# Patient Record
Sex: FEMALE | Race: WHITE | NOT HISPANIC OR LATINO | Employment: UNEMPLOYED | URBAN - NONMETROPOLITAN AREA
[De-identification: names, ages, dates, MRNs, and addresses within clinical notes are randomized per-mention and may not be internally consistent; named-entity substitution may affect disease eponyms.]

---

## 2018-03-07 ENCOUNTER — OFFICE VISIT (OUTPATIENT)
Dept: CARDIOLOGY | Facility: CLINIC | Age: 23
End: 2018-03-07

## 2018-03-07 VITALS
SYSTOLIC BLOOD PRESSURE: 129 MMHG | WEIGHT: 211.4 LBS | OXYGEN SATURATION: 97 % | HEART RATE: 106 BPM | BODY MASS INDEX: 35.22 KG/M2 | HEIGHT: 65 IN | DIASTOLIC BLOOD PRESSURE: 88 MMHG

## 2018-03-07 DIAGNOSIS — R06.02 SHORTNESS OF BREATH: ICD-10-CM

## 2018-03-07 DIAGNOSIS — R00.2 PALPITATIONS: Primary | ICD-10-CM

## 2018-03-07 DIAGNOSIS — R00.0 TACHYCARDIA: ICD-10-CM

## 2018-03-07 PROCEDURE — 99204 OFFICE O/P NEW MOD 45 MIN: CPT | Performed by: PHYSICIAN ASSISTANT

## 2018-03-07 PROCEDURE — 93000 ELECTROCARDIOGRAM COMPLETE: CPT | Performed by: PHYSICIAN ASSISTANT

## 2018-03-07 NOTE — PROGRESS NOTES
Subjective   Melissa Kramer is a 22 y.o. female     Chief Complaint   Patient presents with   • [facility]     patient appears in office today to [facility] cardiac care    • Palpitations       HPI  The patient presents today for initial evaluation.  She presents in the setting of palpitations and shortness of air.  The patient reports no onset palpitations approximately 2-1/2 weeks ago.  She tells me that she was in her usual state of health when she started noticing onset of brief irregularities.  She had no change in lifestyle at that time.  She had no change in medical regimen, over-the-counter or prescribed.  She would note brief irregularities in heart rhythm followed by tachycardia at times.  This persisted for several days.  She found nothing which would aggravate or alleviate her symptoms.  She does note that she would get mild increase in dyspnea with symptoms of palpitations and tachycardia.  The patient had no PND orthopnea.  She did have laboratory evaluation with her primary care provider.  She tells me that labs at that time were unremarkable.  We do not have copies of those labs.  Exercise capacity has been somewhat limited because of symptoms.  Otherwise, she has had no failure symptoms.  She has had only mild chest pain when her palpitations are little more severe.  She was scheduled for Holter and an echo through [facility].  We have not received copies of those.  She has no further complaints otherwise.      No current outpatient prescriptions on file.     No current facility-administered medications for this visit.        Keflex [cephalexin]    History reviewed. No pertinent past medical history.    Social History     Social History   • Marital status:      Spouse name: N/A   • Number of children: N/A   • Years of education: N/A     Occupational History   • Not on file.     Social History Main Topics   • Smoking status: Never Smoker   • Smokeless tobacco: Never Used   • Alcohol  "use No   • Drug use: No   • Sexual activity: Defer     Other Topics Concern   • Not on file     Social History Narrative   • No narrative on file       Family History   Problem Relation Age of Onset   • No Known Problems Mother    • No Known Problems Father        Review of Systems   Constitutional: Positive for fatigue (increase in fatigue).   HENT: Negative.  Negative for congestion, rhinorrhea, sneezing and sore throat.    Eyes: Negative.  Negative for visual disturbance.   Respiratory: Positive for shortness of breath (easily SOA; worse on exertion ). Negative for apnea, cough, chest tightness and wheezing.    Cardiovascular: Positive for palpitations (increasing episodes of palpitations/flutters). Negative for chest pain (denies CP) and leg swelling.   Gastrointestinal: Negative.  Negative for abdominal distention, abdominal pain, nausea and vomiting.   Endocrine: Negative.  Negative for cold intolerance, heat intolerance, polyphagia and polyuria.   Genitourinary: Negative.  Negative for difficulty urinating, frequency and urgency.   Musculoskeletal: Negative.  Negative for arthralgias, back pain, myalgias, neck pain and neck stiffness.   Skin: Negative.  Negative for rash and wound.   Allergic/Immunologic: Negative.  Negative for environmental allergies and food allergies.   Neurological: Negative.  Negative for dizziness, weakness, light-headedness and headaches.   Hematological: Bruises/bleeds easily (bruises and bleeds easily).   Psychiatric/Behavioral: Negative for agitation, confusion and sleep disturbance (denies waking up smothering/SOA). The patient is nervous/anxious (easily nervous/anxious).        Objective     Vitals:    03/07/18 1319   BP: 129/88   BP Location: Left arm   Patient Position: Standing   Pulse: 106   SpO2: 97%   Weight: 95.9 kg (211 lb 6.4 oz)   Height: 165.1 cm (65\")        /88 (BP Location: Left arm, Patient Position: Standing)  Pulse 106  Ht 165.1 cm (65\")  Wt 95.9 kg (211 " lb 6.4 oz)  SpO2 97%  BMI 35.18 kg/m2     Lab Results (most recent)     None          Physical Exam   Constitutional: She is oriented to person, place, and time. She appears well-developed and well-nourished. No distress.   HENT:   Head: Normocephalic and atraumatic.   Eyes: Conjunctivae and EOM are normal. Pupils are equal, round, and reactive to light.   Neck: Normal range of motion. Neck supple. No JVD present. No tracheal deviation present.   Cardiovascular: Normal rate, regular rhythm, normal heart sounds and intact distal pulses.    Pulmonary/Chest: Effort normal and breath sounds normal.   Abdominal: Soft. Bowel sounds are normal. She exhibits no distension and no mass. There is no tenderness. There is no rebound and no guarding.   Musculoskeletal: Normal range of motion. She exhibits no edema, tenderness or deformity.   Neurological: She is alert and oriented to person, place, and time.   Skin: Skin is warm and dry. No rash noted. No erythema. No pallor.   Psychiatric: She has a normal mood and affect. Her behavior is normal. Judgment and thought content normal.   Nursing note and vitals reviewed.      Procedure     ECG 12 Lead  Date/Time: 3/7/2018 1:43 PM  Performed by: FREDO GRANT  Authorized by: FREDO GRANT   Comments: Palpitations    Sinus rhythm at 72, normal axis, early precordial R-wave progression, no acute changes noted.                 Assessment/Plan      Diagnosis Plan   1. Palpitations  ECG 12 Lead   2. Tachycardia     3. Shortness of breath         The patient's Holter and echo is not available today.  That has not been read at [facility].  We have called and requested those results.  I would like to review those before we recommend further to the patient.  We have also requested labs from her primary care provider.  Nothing further for now.  We will review the above and recommend further to the patient at that time.  She will call for any complications prior to  follow-up.            Patient's BMI is above normal parameters. Follow-up plan includes:  educational material and referral to primary care.           Electronically signed by:

## 2018-03-07 NOTE — PATIENT INSTRUCTIONS
Palpitations  A palpitation is the feeling that your heart:  · Has an uneven (irregular) heartbeat.  · Is beating faster than normal.  · Is fluttering.  · Is skipping a beat.  This is usually not a serious problem. In some cases, you may need more medical tests.  Follow these instructions at home:  · Avoid:  ¨ Caffeine in coffee, tea, soft drinks, diet pills, and energy drinks.  ¨ Chocolate.  ¨ Alcohol.  · Do not use any tobacco products. These include cigarettes, chewing tobacco, and e-cigarettes. If you need help quitting, ask your doctor.  · Try to reduce your stress. These things may help:  ¨ Yoga.  ¨ Meditation.  ¨ Physical activity. Swimming, jogging, and walking are good choices.  ¨ A method that helps you use your mind to control things in your body, like heartbeats (biofeedback).  · Get plenty of rest and sleep.  · Take over-the-counter and prescription medicines only as told by your doctor.  · Keep all follow-up visits as told by your doctor. This is important.  Contact a doctor if:  · Your heartbeat is still fast or uneven after 24 hours.  · Your palpitations occur more often.  Get help right away if:  · You have chest pain.  · You feel short of breath.  · You have a very bad headache.  · You feel dizzy.  · You pass out (faint).  This information is not intended to replace advice given to you by your health care provider. Make sure you discuss any questions you have with your health care provider.  Document Released: 09/26/2009 Document Revised: 05/25/2017 Document Reviewed: 09/01/2016  [employer] Interactive Patient Education © 2017 [employer]  BMI for Adults  Body mass index (BMI) is a number that is calculated from a person's weight and height. In most adults, the number is used to find how much of an adult's weight is made up of fat. BMI is not as accurate as a direct measure of body fat.  How is BMI calculated?  BMI is calculated by dividing weight in kilograms by height in meters squared. It can  also be calculated by dividing weight in pounds by height in inches squared, then multiplying the resulting number by 703. Charts are available to help you find your BMI quickly and easily without doing this calculation.  How is BMI interpreted?  Health care professionals use BMI charts to identify whether an adult is underweight, at a normal weight, or overweight based on the following guidelines:  · Underweight: BMI less than 18.5.  · Normal weight: BMI between 18.5 and 24.9.  · Overweight: BMI between 25 and 29.9.  · Obese: BMI of 30 and above.  BMI is usually interpreted the same for males and females.  Weight includes both fat and muscle, so someone with a muscular build, such as an athlete, may have a BMI that is higher than 24.9. In cases like these, BMI may not accurately depict body fat. To determine if excess body fat is the cause of a BMI of 25 or higher, further assessments may need to be done by a health care provider.  Why is BMI a useful tool?  BMI is used to identify a possible weight problem that may be related to a medical problem or may increase the risk for medical problems. BMI can also be used to promote changes to reach a healthy weight.  This information is not intended to replace advice given to you by your health care provider. Make sure you discuss any questions you have with your health care provider.  Document Released: 08/29/2005 Document Revised: 04/27/2017 Document Reviewed: 05/15/2015  [employer] Interactive Patient Education © 2017 [employer]

## 2018-03-08 ENCOUNTER — TELEPHONE (OUTPATIENT)
Dept: CARDIOLOGY | Facility: CLINIC | Age: 23
End: 2018-03-08

## 2018-03-08 NOTE — TELEPHONE ENCOUNTER
Fredo Mayes, PAC reviewed Echo and Holter monitor From [facility]. Echo demonstrated mild pulmonary hypertension PA pressures 51 mmHg and Holter demonstrated rare PAC's normal sinus rhythm . Fredo wants to order a limited echo to reevaluate PA pressures. If patient's palpitations are very bothersome she can start metoprolol tartrate 25 mg BID. Patient verbalized understanding and wants to hold off for now on starting metoprolol. Patient is willing to proceed with limited echo and was informed Miriin would call to schedule testing and testing in done in our basement. Patient is to call our office with questions or concerns.      ----- Message from Bev Oliveros sent at 3/8/2018  2:44 PM EST -----  Contact: BERNA  PT CALLED REQUESTING ECHO RESULTS SHE HAD DONE FOR HER PCP BECAUSE HER PCP IS OUT TODAY.  PT SAW FREDO GUERRAWINDY YESTERDAY .

## 2018-03-12 ENCOUNTER — TELEPHONE (OUTPATIENT)
Dept: CARDIOLOGY | Facility: CLINIC | Age: 23
End: 2018-03-12

## 2018-03-12 DIAGNOSIS — I27.20 PULMONARY HYPERTENSION (HCC): ICD-10-CM

## 2018-03-12 DIAGNOSIS — R00.2 PALPITATIONS: ICD-10-CM

## 2018-03-12 DIAGNOSIS — R93.1 ABNORMAL ECHOCARDIOGRAM: ICD-10-CM

## 2018-03-12 DIAGNOSIS — R06.02 SHORTNESS OF BREATH: Primary | ICD-10-CM

## 2018-03-12 NOTE — TELEPHONE ENCOUNTER
Patient aware of echo as described in last telephone call.     ----- Message from Mariana Oshea sent at 3/12/2018  4:02 PM EDT -----   BERNA IS CALLING TO SEE IF WE GOT THE RESULTS OF HER ECHO FROM [facility] YET. SHE CAN BE REACHED [phone]

## 2018-04-11 ENCOUNTER — OFFICE VISIT (OUTPATIENT)
Dept: CARDIOLOGY | Facility: CLINIC | Age: 23
End: 2018-04-11

## 2018-04-11 VITALS
BODY MASS INDEX: 35.19 KG/M2 | HEIGHT: 65 IN | WEIGHT: 211.2 LBS | OXYGEN SATURATION: 97 % | HEART RATE: 86 BPM | DIASTOLIC BLOOD PRESSURE: 78 MMHG | SYSTOLIC BLOOD PRESSURE: 116 MMHG

## 2018-04-11 DIAGNOSIS — R42 DIZZINESS: ICD-10-CM

## 2018-04-11 DIAGNOSIS — R07.9 CHEST PAIN, UNSPECIFIED TYPE: Primary | ICD-10-CM

## 2018-04-11 DIAGNOSIS — R60.0 LOCALIZED EDEMA: ICD-10-CM

## 2018-04-11 DIAGNOSIS — R00.2 PALPITATIONS: ICD-10-CM

## 2018-04-11 DIAGNOSIS — I27.20 PULMONARY HYPERTENSION (HCC): ICD-10-CM

## 2018-04-11 DIAGNOSIS — R93.1 ABNORMAL ECHOCARDIOGRAM: ICD-10-CM

## 2018-04-11 DIAGNOSIS — R06.02 SHORTNESS OF BREATH: ICD-10-CM

## 2018-04-11 PROCEDURE — 99214 OFFICE O/P EST MOD 30 MIN: CPT | Performed by: INTERNAL MEDICINE

## 2018-04-11 NOTE — PATIENT INSTRUCTIONS
"Fat and Cholesterol Restricted Diet  Getting too much fat and cholesterol in your diet may cause health problems. Following this diet helps keep your fat and cholesterol at normal levels. This can keep you from getting sick.  What types of fat should I choose?  · Choose monosaturated and polyunsaturated fats. These are found in foods such as olive oil, canola oil, flaxseeds, walnuts, almonds, and seeds.  · Eat more omega-3 fats. Good choices include salmon, mackerel, sardines, tuna, flaxseed oil, and ground flaxseeds.  · Limit saturated fats. These are in animal products such as meats, butter, and cream. They can also be in plant products such as palm oil, palm kernel oil, and coconut oil.  · Avoid foods with partially hydrogenated oils in them. These contain trans fats. Examples of foods that have trans fats are stick margarine, some tub margarines, cookies, crackers, and other baked goods.  What general guidelines do I need to follow?  · Check food labels. Look for the words \"trans fat\" and \"saturated fat.\"  · When preparing a meal:  ¨ Fill half of your plate with vegetables and green salads.  ¨ Fill one fourth of your plate with whole grains. Look for the word \"whole\" as the first word in the ingredient list.  ¨ Fill one fourth of your plate with lean protein foods.  · Eat more foods that have fiber, like apples, carrots, beans, peas, and barley.  · Eat more home-cooked foods. Eat less at restaurants and buffets.  · Limit or avoid alcohol.  · Limit foods high in starch and sugar.  · Limit fried foods.  · Cook foods without frying them. Baking, boiling, grilling, and broiling are all great options.  · Lose weight if you are overweight. Losing even a small amount of weight can help your overall health. It can also help prevent diseases such as diabetes and heart disease.  What foods can I eat?  Grains   Whole grains, such as whole wheat or whole grain breads, crackers, cereals, and pasta. Unsweetened oatmeal, " bulgur, barley, quinoa, or brown rice. Corn or whole wheat flour tortillas.  Vegetables   Fresh or frozen vegetables (raw, steamed, roasted, or grilled). Green salads.  Fruits   All fresh, canned (in natural juice), or frozen fruits.  Meat and Other Protein Products   Ground beef (85% or leaner), grass-fed beef, or beef trimmed of fat. Skinless chicken or turkey. Ground chicken or turkey. Pork trimmed of fat. All fish and seafood. Eggs. Dried beans, peas, or lentils. Unsalted nuts or seeds. Unsalted canned or dry beans.  Dairy   Low-fat dairy products, such as skim or 1% milk, 2% or reduced-fat cheeses, low-fat ricotta or cottage cheese, or plain low-fat yogurt.  Fats and Oils   Tub margarines without trans fats. Light or reduced-fat mayonnaise and salad dressings. Avocado. Olive, canola, sesame, or safflower oils. Natural peanut or almond butter (choose ones without added sugar and oil).  The items listed above may not be a complete list of recommended foods or beverages. Contact your dietitian for more options.   What foods are not recommended?  Grains   White bread. White pasta. White rice. Cornbread. Bagels, pastries, and croissants. Crackers that contain trans fat.  Vegetables   White potatoes. Corn. Creamed or fried vegetables. Vegetables in a cheese sauce.  Fruits   Dried fruits. Canned fruit in light or heavy syrup. Fruit juice.  Meat and Other Protein Products   Fatty cuts of meat. Ribs, chicken wings, guerra, sausage, bologna, salami, chitterlings, fatback, hot dogs, bratwurst, and packaged luncheon meats. Liver and organ meats.  Dairy   Whole or 2% milk, cream, half-and-half, and cream cheese. Whole milk cheeses. Whole-fat or sweetened yogurt. Full-fat cheeses. Nondairy creamers and whipped toppings. Processed cheese, cheese spreads, or cheese curds.  Sweets and Desserts   Corn syrup, sugars, honey, and molasses. Candy. Jam and jelly. Syrup. Sweetened cereals. Cookies, pies, cakes, donuts, muffins, and ice  cream.  Fats and Oils   Butter, stick margarine, lard, shortening, ghee, or guerra fat. Coconut, palm kernel, or palm oils.  Beverages   Alcohol. Sweetened drinks (such as sodas, lemonade, and fruit drinks or punches).  The items listed above may not be a complete list of foods and beverages to avoid. Contact your dietitian for more information.   This information is not intended to replace advice given to you by your health care provider. Make sure you discuss any questions you have with your health care provider.  Document Released: 06/18/2013 Document Revised: 08/24/2017 Document Reviewed: 03/19/2015  [employer] Interactive Patient Education © 2017 [employer]  BMI for Adults  Body mass index (BMI) is a number that is calculated from a person's weight and height. In most adults, the number is used to find how much of an adult's weight is made up of fat. BMI is not as accurate as a direct measure of body fat.  How is BMI calculated?  BMI is calculated by dividing weight in kilograms by height in meters squared. It can also be calculated by dividing weight in pounds by height in inches squared, then multiplying the resulting number by 703. Charts are available to help you find your BMI quickly and easily without doing this calculation.  How is BMI interpreted?  Health care professionals use BMI charts to identify whether an adult is underweight, at a normal weight, or overweight based on the following guidelines:  · Underweight: BMI less than 18.5.  · Normal weight: BMI between 18.5 and 24.9.  · Overweight: BMI between 25 and 29.9.  · Obese: BMI of 30 and above.  BMI is usually interpreted the same for males and females.  Weight includes both fat and muscle, so someone with a muscular build, such as an athlete, may have a BMI that is higher than 24.9. In cases like these, BMI may not accurately depict body fat. To determine if excess body fat is the cause of a BMI of 25 or higher, further assessments may need to be  done by a health care provider.  Why is BMI a useful tool?  BMI is used to identify a possible weight problem that may be related to a medical problem or may increase the risk for medical problems. BMI can also be used to promote changes to reach a healthy weight.  This information is not intended to replace advice given to you by your health care provider. Make sure you discuss any questions you have with your health care provider.  Document Released: 08/29/2005 Document Revised: 04/27/2017 Document Reviewed: 05/15/2015  [employer] Interactive Patient Education © 2017 [employer]

## 2018-04-11 NOTE — PROGRESS NOTES
Subjective   Melissa Kramer is a 22 y.o. female     Chief Complaint   Patient presents with   • Follow-up     1 month f/u with Dr. Lloyd per Norberto briceno    • Results     echo        PROBLEM LIST:     1. Palpitations   2. Shortness of breath   2.1 echo from [facility] LVEF 60%, normal diastolic dysfunction, trace MR, borderline pulmonary HTNsystolic pressure estimated 51 mmHg.   3. Chest pain   4. Dizziness       Specialty Problems     None            HPI:  Ms. Kramer returns for follow-up on the above problems.  She continues to have episodes of palpitations, chest pain, and dizziness.  She has not been presyncopal or syncopal.    The patient describes to me short-lived episodes of a rapid regular heartbeat that cause some degree of dizziness.  She also has chest pain which is described as a deep retrosternal ache with some pleuritic component (no worse when coughing).  Symptoms last anywhere from 5 minutes to an hour or more.  The patient also relates that she hasn't had a general decline in her exercise capacity and increase in exertional dyspnea without significant coughing or wheezing.    Echocardiogram reported preserved LV systolic function, no significant diastolic or valve abnormalities, and pulmonary artery systolic pressures of approximately 50 mmHg.  Patient does relate an increase in bilateral lower extremity edema over the past several weeks.              CURRENT MEDICATION:    No current outpatient prescriptions on file.     No current facility-administered medications for this visit.        ALLERGIES:    Keflex [cephalexin]    PAST MEDICAL HISTORY:    History reviewed. No pertinent past medical history.    SURGICAL HISTORY:    Past Surgical History:   Procedure Laterality Date   • WISDOM TOOTH EXTRACTION         SOCIAL HISTORY:    Social History     Social History   • Marital status:      Spouse name: N/A   • Number of children: N/A   • Years of education: N/A     Occupational History   • Not on  file.     Social History Main Topics   • Smoking status: Never Smoker   • Smokeless tobacco: Never Used   • Alcohol use No   • Drug use: No   • Sexual activity: Defer     Other Topics Concern   • Not on file     Social History Narrative   • No narrative on file       FAMILY HISTORY:    Family History   Problem Relation Age of Onset   • Clotting disorder Mother      acute DVt    • Cancer Mother    • Heart disease Mother      cardiac arrest   • Other Mother      respiratory failure    • No Known Problems Father    • Heart disease Maternal Grandfather    • Heart attack Maternal Grandfather        Review of Systems   Constitutional: Positive for diaphoresis. Negative for chills, fatigue and fever.   HENT: Positive for ear pain (left ear pain ) and trouble swallowing. Negative for congestion, dental problem, drooling, ear discharge, facial swelling, hearing loss, mouth sores, nosebleeds, postnasal drip, rhinorrhea, sinus pain, sinus pressure, sneezing, sore throat, tinnitus and voice change.    Eyes: Negative for visual disturbance.   Respiratory: Positive for shortness of breath (with over exertion ). Negative for cough, choking, chest tightness, wheezing and stridor.    Cardiovascular: Positive for chest pain (sharp pain and heaviness, worse with cough, lasting 5 minutes to an hour, occurs with exertion and at rest ), palpitations and leg swelling.   Gastrointestinal: Negative for abdominal pain, blood in stool (no melena, no hematuria, no hematemesis, no hematochezia ), constipation, diarrhea, nausea and vomiting.   Endocrine: Positive for cold intolerance. Negative for heat intolerance.   Genitourinary: Negative for difficulty urinating, frequency, urgency and vaginal pain.   Musculoskeletal: Positive for arthralgias (ankle ). Negative for back pain, gait problem, joint swelling, myalgias, neck pain and neck stiffness.   Skin: Negative for color change, pallor, rash and wound.   Allergic/Immunologic: Negative for  "environmental allergies, food allergies and immunocompromised state.   Neurological: Positive for dizziness (improved since last visit ) and light-headedness (with palpitations ). Negative for tremors, seizures, syncope, facial asymmetry, speech difficulty, weakness, numbness and headaches.   Hematological: Negative for adenopathy. Does not bruise/bleed easily.       VISIT VITALS:  Vitals:    04/11/18 1128   BP: 116/78   BP Location: Left arm   Patient Position: Sitting   Pulse: 86   SpO2: 97%   Weight: 95.8 kg (211 lb 3.2 oz)   Height: 165.1 cm (65\")      /78 (BP Location: Left arm, Patient Position: Sitting)   Pulse 86   Ht 165.1 cm (65\")   Wt 95.8 kg (211 lb 3.2 oz)   SpO2 97%   BMI 35.15 kg/m²     RECENT LABS:    Objective       Physical Exam   Constitutional: She is oriented to person, place, and time. She appears well-developed and well-nourished. No distress.   HENT:   Head: Normocephalic and atraumatic.   Eyes: Conjunctivae, EOM and lids are normal. Pupils are equal, round, and reactive to light. Lids are everted and swept, no foreign bodies found.   Neck: Normal range of motion. Neck supple. Normal carotid pulses, no hepatojugular reflux and no JVD present. Carotid bruit is not present. No tracheal deviation present. No thyroid mass and no thyromegaly present.   Cardiovascular: Normal rate, regular rhythm, S1 normal, S2 normal, normal heart sounds and intact distal pulses.  Exam reveals no gallop, no S3, no S4, no distant heart sounds and no friction rub.    No murmur heard.   No systolic murmur is present    No diastolic murmur is present   P2 is not pronounced      Pulmonary/Chest: Effort normal. She has decreased breath sounds (right base ). She has no wheezes. She has no rhonchi. She has no rales.   Abdominal: Soft. Bowel sounds are normal. She exhibits no distension, no abdominal bruit and no mass. There is no tenderness. There is no rebound and no guarding.   Negative organomegaly    "   Musculoskeletal: Normal range of motion. She exhibits edema (trace edema BLE). She exhibits no tenderness or deformity.   Lymphadenopathy:     She has no cervical adenopathy.     She has no axillary adenopathy.   Neurological: She is alert and oriented to person, place, and time.   Skin: Skin is warm and dry. No rash noted. No erythema. No pallor.   Psychiatric: She has a normal mood and affect. Her speech is normal and behavior is normal. Judgment and thought content normal. Cognition and memory are normal.   Nursing note and vitals reviewed.      Procedures      Assessment/Plan    #1.  Symptom complex of chest pain, palpitations, and dizziness.  This, in conjunction with an echo cardiogram demonstrating moderate pulmonary hypertension, raises the possibility of multi-embolic pulmonary hypertension/pulmonary embolism as a cause of symptoms.    #2.  Therefore, I would like to order stat d-dimer and, if positive, follow-up with CT pulmonary angiogram.    #3.  We will perform a 14 day event monitor to assess ongoing rhythm abnormalities.    #4.  I would like to review the echocardiogram performed at [facility] (as opposed to simply reviewing the interpretive report) has I suspect, based on physical exam today, that a tricuspid valve closure click was measured as TR velocity rather than a regurgitant and below itself.    #5.  If d-dimer is positive the patient will undergo CT pulmonary angiogram on an expedited basis.  Otherwise, we will see the patient in follow-up after event monitor and echo were reviewed.    #6.  The patient understands to report immediately for any worsening of symptoms, and to report to the emergency room for severe chest pain, presyncope or syncope, as discussed today.          No diagnosis found.    No Follow-up on file.              Patient's Body mass index is 35.15 kg/m². BMI is above normal parameters. Follow-up plan includes:  educational material.       Karl Daniel  HAYLIE Jaffe   Scribed for Dr. Néstor Lloyd by SHAKIRA Iglesias April 11, 2018 12:26 PM               Electronically signed by:            This note is dictated utilizing voice recognition software.  Although this record has been proof read, transcriptional errors may still be present. If questions occur regarding the content of this record please do not hesitate to call our office.

## 2018-04-18 ENCOUNTER — TELEPHONE (OUTPATIENT)
Dept: CARDIOLOGY | Facility: CLINIC | Age: 23
End: 2018-04-18

## 2018-04-18 NOTE — TELEPHONE ENCOUNTER
I have given the CD and echo report to Dr. Lloyd to review. He will review those at his convenience and I will call patient once the films have been reviewed.       ----- Message from Ethel Winters sent at 4/18/2018 11:40 AM EDT -----  Contact: PT  DROPPED OFF DISK FROM [facility] WITH ECHO FOR DR LLOYD AT 11:38AM AND IT WAS GIVEN TO ALEXYS.

## 2018-04-19 ENCOUNTER — TELEPHONE (OUTPATIENT)
Dept: CARDIOLOGY | Facility: CLINIC | Age: 23
End: 2018-04-19

## 2018-04-19 NOTE — TELEPHONE ENCOUNTER
I have asked CORNELIO Lloyd to review those echo films on 04/18/18 and 04/19/18 at 4:58 pm LALIT/SHAKIRA

## 2018-04-24 ENCOUNTER — TELEPHONE (OUTPATIENT)
Dept: CARDIOLOGY | Facility: CLINIC | Age: 23
End: 2018-04-24

## 2018-04-24 NOTE — TELEPHONE ENCOUNTER
I have asked CORNELIO Lloyd to review those echo films on 04/18/18 and 04/19/18 at 4:58 pm MF/RMA    Dr. Lloyd attempted to review films on his [employer] system and films would not pull up. Reading systems are different and unable to review. 04/24/18 @ 1:16 pm     Akila Lawson echo tech is trying to get films pulled up and if not then she is going to ask Yon, IT tech to help. 04/24/18 @ 5:17 pm       ----- Message from Mariana Oshea sent at 4/19/2018  4:29 PM EDT -----   Melissa called to see if Dr. Lloyd has had a chance to reivew the echo disc she dropped off at our office earlier in the week.

## 2018-05-01 ENCOUNTER — TELEPHONE (OUTPATIENT)
Dept: CARDIOLOGY | Facility: CLINIC | Age: 23
End: 2018-05-01

## 2018-05-01 NOTE — TELEPHONE ENCOUNTER
Dr. Lloyd reviewed ECHO films and states PA pressures where 25-30 mmHg. Trace TR and MR. F/u routine and look for a non cardiac issues of shortness of breath. I have spoke with patient and made aware to continue to wear monitor.  Once she sends monitor back HAYLIE Bansal aware will call pt with those results. patient made aware and to call our office with questions or concerns.

## 2018-05-16 ENCOUNTER — OUTSIDE FACILITY SERVICE (OUTPATIENT)
Dept: CARDIOLOGY | Facility: CLINIC | Age: 23
End: 2018-05-16

## 2018-05-16 PROCEDURE — 93272 ECG/REVIEW INTERPRET ONLY: CPT | Performed by: INTERNAL MEDICINE

## 2018-05-22 ENCOUNTER — TELEPHONE (OUTPATIENT)
Dept: CARDIOLOGY | Facility: CLINIC | Age: 23
End: 2018-05-22

## 2018-11-20 ENCOUNTER — OFFICE VISIT (OUTPATIENT)
Dept: CARDIOLOGY | Facility: CLINIC | Age: 23
End: 2018-11-20

## 2018-11-20 VITALS
HEIGHT: 65 IN | BODY MASS INDEX: 35.42 KG/M2 | SYSTOLIC BLOOD PRESSURE: 117 MMHG | DIASTOLIC BLOOD PRESSURE: 81 MMHG | WEIGHT: 212.6 LBS | OXYGEN SATURATION: 100 % | HEART RATE: 82 BPM

## 2018-11-20 DIAGNOSIS — R00.2 PALPITATIONS: ICD-10-CM

## 2018-11-20 DIAGNOSIS — R06.02 SHORTNESS OF BREATH: ICD-10-CM

## 2018-11-20 DIAGNOSIS — R07.9 CHEST PAIN, UNSPECIFIED TYPE: Primary | ICD-10-CM

## 2018-11-20 PROCEDURE — 99213 OFFICE O/P EST LOW 20 MIN: CPT | Performed by: NURSE PRACTITIONER

## 2018-11-20 RX ORDER — NICOTINE POLACRILEX 4 MG/1
20 GUM, CHEWING ORAL DAILY
COMMUNITY
Start: 2018-03-12 | End: 2021-09-07

## 2018-11-20 NOTE — PATIENT INSTRUCTIONS
Palpitations  A palpitation is the feeling that your heartbeat is irregular or is faster than normal. It may feel like your heart is fluttering or skipping a beat. Palpitations are usually not a serious problem. They may be caused by many things, including smoking, caffeine, alcohol, stress, and certain medicines. Although most causes of palpitations are not serious, palpitations can be a sign of a serious medical problem. In some cases, you may need further medical evaluation.  Follow these instructions at home:  Pay attention to any changes in your symptoms. Take these actions to help with your condition:  · Avoid the following:  ? Caffeinated coffee, tea, soft drinks, diet pills, and energy drinks.  ? Chocolate.  ? Alcohol.  · Do not use any tobacco products, such as cigarettes, chewing tobacco, and e-cigarettes. If you need help quitting, ask your health care provider.  · Try to reduce your stress and anxiety. Things that can help you relax include:  ? Yoga.  ? Meditation.  ? Physical activity, such as swimming, jogging, or walking.  ? Biofeedback. This is a method that helps you learn to use your mind to control things in your body, such as your heartbeats.  · Get plenty of rest and sleep.  · Take over-the-counter and prescription medicines only as told by your health care provider.  · Keep all follow-up visits as told by your health care provider. This is important.    Contact a health care provider if:  · You continue to have a fast or irregular heartbeat after 24 hours.  · Your palpitations occur more often.  Get help right away if:  · You have chest pain or shortness of breath.  · You have a severe headache.  · You feel dizzy or you faint.  This information is not intended to replace advice given to you by your health care provider. Make sure you discuss any questions you have with your health care provider.  Document Released: 12/15/2001 Document Revised: 05/22/2017 Document Reviewed: 09/01/2016  [employer]  Interactive Patient Education © 2018 [employer]  Nonspecific Chest Pain  Chest pain can be caused by many different conditions. There is a chance that your pain could be related to something serious, such as a heart attack or a blood clot in your lungs. Chest pain can also be caused by conditions that are not life-threatening. If you have chest pain, it is very important to follow up with your doctor.  Follow these instructions at home:  Medicines  · If you were prescribed an antibiotic medicine, take it as told by your doctor. Do not stop taking the antibiotic even if you start to feel better.  · Take over-the-counter and prescription medicines only as told by your doctor.  Lifestyle  · Do not use any products that contain nicotine or tobacco, such as cigarettes and e-cigarettes. If you need help quitting, ask your doctor.  · Do not drink alcohol.  · Make lifestyle changes as told by your doctor. These may include:  ? Getting regular exercise. Ask your doctor for some activities that are safe for you.  ? Eating a heart-healthy diet. A diet specialist (dietitian) can help you to learn healthy eating options.  ? Staying at a healthy weight.  ? Managing diabetes, if needed.  ? Lowering your stress, as with deep breathing or spending time in nature.  General instructions  · Avoid any activities that make you feel chest pain.  · If your chest pain is because of heartburn:  ? Raise (elevate) the head of your bed about 6 inches (15 cm). You can do this by putting blocks under the bed legs at the head of the bed.  ? Do not sleep with extra pillows under your head. That does not help heartburn.  · Keep all follow-up visits as told by your doctor. This is important. This includes any further testing if your chest pain does not go away.  Contact a doctor if:  · Your chest pain does not go away.  · You have a rash with blisters on your chest.  · You have a fever.  · You have chills.  Get help right away if:  · Your chest  pain is worse.  · You have a cough that gets worse, or you cough up blood.  · You have very bad (severe) pain in your belly (abdomen).  · You are very weak.  · You pass out (faint).  · You have either of these for no clear reason:  ? Sudden chest discomfort.  ? Sudden discomfort in your arms, back, neck, or jaw.  · You have shortness of breath at any time.  · You suddenly start to sweat, or your skin gets clammy.  · You feel sick to your stomach (nauseous).  · You throw up (vomit).  · You suddenly feel light-headed or dizzy.  · Your heart starts to beat fast, or it feels like it is skipping beats.  These symptoms may be an emergency. Do not wait to see if the symptoms will go away. Get medical help right away. Call your local emergency services (911 in the [place] Do not drive yourself to the hospital.  This information is not intended to replace advice given to you by your health care provider. Make sure you discuss any questions you have with your health care provider.  Document Released: 06/05/2009 Document Revised: 09/11/2017 Document Reviewed: 09/11/2017  Elsevier Interactive Patient Education © 2017 [employer] Inc[employer]

## 2018-11-20 NOTE — PROGRESS NOTES
"Subjective   Melissa Kramer is a 23 y.o. female     Chief Complaint   Patient presents with   • Follow-up     patient appears in office today for hospital follow up    • Chest Pain     pt reports last \"chest pain\" episode on 11/12/2018. reports going to ER on 11/02/ and 11/11 with negative cardiac work up    • Palpitations     pt reports palpitations/flutters   • Shortness of Breath     pt reports easily SOA; worse on exertion        HPI    PROBLEM LIST:     1. Palpitations   1.1 event monitor 4/17-5/16/28-normal sinus rhythm, sinus tach  2. Shortness of breath   2.1 echo from [facility] LVEF 60%, normal diastolic dysfunction, trace MR, borderline pulmonary HTNsystolic pressure estimated 51 mmHg.   3. Chest pain   4. Dizziness     Patient is a 23-year-old female who presents today for follow-up status post ER visit ×2.  She has had 2 episodes of what she describes as midsternal stabbing pain.  She says when this occurred she became short of breath, nauseated and lightheaded.  She says that they didn't last for an extended period of time which is what prompted her to go to the ER.  One episode she had while she was at work and the second episode occurred when she was sleeping which will correct from a dead sleep.  She states that she does have frequent palpitations which sometimes feels like fluttering and other signs and feels like skipping.  She denies any dizziness, presyncope, syncope, PND or edema.  She says there are times where she will wake up short of breath.  She says she does easily get short of breath.  When she was in the ER her d-dimer was elevated but they did a CT of the chest and ruled out pulmonary embolism.  Patient does have significant family history of coronary artery disease on both sides.  Patient works at the  at the ER.    Current Outpatient Medications   Medication Sig Dispense Refill   • Omeprazole 20 MG tablet delayed-release Take 20 mg by mouth Daily.       No current " facility-administered medications for this visit.        ALLERGIES    Keflex [cephalexin]    History reviewed. No pertinent past medical history.    Social History     Socioeconomic History   • Marital status:      Spouse name: Not on file   • Number of children: Not on file   • Years of education: Not on file   • Highest education level: Not on file   Social Needs   • Financial resource strain: Not on file   • Food insecurity - worry: Not on file   • Food insecurity - inability: Not on file   • Transportation needs - medical: Not on file   • Transportation needs - non-medical: Not on file   Occupational History   • Not on file   Tobacco Use   • Smoking status: Never Smoker   • Smokeless tobacco: Never Used   Substance and Sexual Activity   • Alcohol use: No   • Drug use: No   • Sexual activity: Yes     Partners: Male     Birth control/protection: Implant   Other Topics Concern   • Not on file   Social History Narrative   • Not on file       Family History   Problem Relation Age of Onset   • Clotting disorder Mother         acute DVt    • Cancer Mother    • Heart disease Mother         cardiac arrest   • Other Mother         respiratory failure    • No Known Problems Father    • Heart disease Maternal Grandfather    • Heart attack Maternal Grandfather        Review of Systems   Constitutional: Positive for fatigue (easily fatigued).   HENT: Positive for sneezing (occasional sneezing from seasonal allergies). Negative for congestion and rhinorrhea.    Eyes: Negative for visual disturbance.   Respiratory: Positive for shortness of breath (easily SOA ; worse on exertion; with CP). Negative for apnea, cough, chest tightness and wheezing.    Cardiovascular: Positive for chest pain (precordial pain, stabbing pain; 2 episodes went to ER for both; one time was at work, other time she was awaken from dead sleep ) and palpitations (frequent palpitations/flutters; skipping; occurs at anytime ). Negative for leg  "swelling.   Gastrointestinal: Positive for nausea (with CP ). Negative for abdominal distention, abdominal pain and vomiting.   Endocrine: Negative for cold intolerance and heat intolerance.   Genitourinary: Negative for difficulty urinating, frequency and urgency.   Musculoskeletal: Negative for arthralgias, back pain, myalgias, neck pain and neck stiffness.   Skin: Negative for rash and wound.   Allergic/Immunologic: Positive for environmental allergies (seasonal allergies). Negative for food allergies.   Neurological: Positive for light-headedness (with CP ) and headaches (occasional H/A's). Negative for dizziness, syncope and weakness.   Hematological: Bruises/bleeds easily (bruises easily).   Psychiatric/Behavioral: Positive for sleep disturbance (wakes up smothering/SOA). Negative for agitation and confusion. The patient is not nervous/anxious.        Objective   /81 (BP Location: Left arm, Patient Position: Sitting)   Pulse 82   Ht 165.1 cm (65\")   Wt 96.4 kg (212 lb 9.6 oz)   SpO2 100%   BMI 35.38 kg/m²   Vitals:    11/20/18 1039   BP: 117/81   BP Location: Left arm   Patient Position: Sitting   Pulse: 82   SpO2: 100%   Weight: 96.4 kg (212 lb 9.6 oz)   Height: 165.1 cm (65\")      Lab Results (most recent)     None        Physical Exam   Constitutional: She is oriented to person, place, and time. Vital signs are normal. She appears well-developed and well-nourished. She is active and cooperative.   HENT:   Head: Normocephalic.   Eyes: Lids are normal.   Neck: Normal carotid pulses, no hepatojugular reflux and no JVD present. Carotid bruit is not present.   Cardiovascular: Normal rate, regular rhythm and normal heart sounds.   Pulses:       Radial pulses are 2+ on the right side, and 2+ on the left side.        Dorsalis pedis pulses are 2+ on the right side, and 2+ on the left side.        Posterior tibial pulses are 2+ on the right side, and 2+ on the left side.   No edema BLE.   Pulmonary/Chest: " Effort normal and breath sounds normal.   Abdominal: Normal appearance and bowel sounds are normal.   Neurological: She is alert and oriented to person, place, and time.   Skin: Skin is warm, dry and intact.   Psychiatric: She has a normal mood and affect. Her speech is normal and behavior is normal. Judgment and thought content normal. Cognition and memory are normal.       Procedure   Procedures         Assessment/Plan      Diagnosis Plan   1. Chest pain, unspecified type  Treadmill Stress Test    Adult Transthoracic Echo Complete W/ Cont if Necessary Per Protocol   2. Shortness of breath  Treadmill Stress Test    Adult Transthoracic Echo Complete W/ Cont if Necessary Per Protocol   3. Palpitations  Treadmill Stress Test    Adult Transthoracic Echo Complete W/ Cont if Necessary Per Protocol       Return in about 9 weeks (around 1/22/2019).  Chest/shortness of breath/palpitations-patient will have ischemia workup, treadmill stress test and echocardiogram.  She will continue her medication regimen for now.  She'll follow-up in 9 weeks or sooner if any changes.         Patient's Body mass index is 35.38 kg/m². BMI is above normal parameters. Recommendations include: educational material and referral to primary care.      Electronically signed by:

## 2018-12-03 ENCOUNTER — HOSPITAL ENCOUNTER (OUTPATIENT)
Dept: CARDIOLOGY | Facility: HOSPITAL | Age: 23
Discharge: HOME | End: 2018-12-03
Admitting: NURSE PRACTITIONER

## 2018-12-03 ENCOUNTER — HOSPITAL ENCOUNTER (OUTPATIENT)
Dept: CARDIOLOGY | Facility: HOSPITAL | Age: 23
Discharge: HOME | End: 2018-12-03

## 2018-12-03 VITALS — BODY MASS INDEX: 35.41 KG/M2 | HEIGHT: 65 IN | WEIGHT: 212.52 LBS

## 2018-12-03 DIAGNOSIS — R07.9 CHEST PAIN, UNSPECIFIED TYPE: ICD-10-CM

## 2018-12-03 DIAGNOSIS — R00.2 PALPITATIONS: ICD-10-CM

## 2018-12-03 DIAGNOSIS — R06.02 SHORTNESS OF BREATH: ICD-10-CM

## 2018-12-03 PROCEDURE — 93306 TTE W/DOPPLER COMPLETE: CPT

## 2018-12-03 PROCEDURE — 93018 CV STRESS TEST I&R ONLY: CPT | Performed by: INTERNAL MEDICINE

## 2018-12-03 PROCEDURE — 93017 CV STRESS TEST TRACING ONLY: CPT

## 2018-12-03 PROCEDURE — 93306 TTE W/DOPPLER COMPLETE: CPT | Performed by: INTERNAL MEDICINE

## 2018-12-04 ENCOUNTER — TELEPHONE (OUTPATIENT)
Dept: CARDIOLOGY | Facility: CLINIC | Age: 23
End: 2018-12-04

## 2018-12-04 LAB
BH CV STRESS BP STAGE 1: NORMAL
BH CV STRESS BP STAGE 2: NORMAL
BH CV STRESS DURATION MIN STAGE 1: 3
BH CV STRESS DURATION MIN STAGE 2: 3
BH CV STRESS DURATION SEC STAGE 1: 0
BH CV STRESS DURATION SEC STAGE 2: 0
BH CV STRESS GRADE STAGE 1: 10
BH CV STRESS GRADE STAGE 2: 12
BH CV STRESS HR STAGE 1: 144
BH CV STRESS HR STAGE 2: 176
BH CV STRESS METS STAGE 1: 5
BH CV STRESS METS STAGE 2: 7.5
BH CV STRESS PROTOCOL 1: NORMAL
BH CV STRESS RECOVERY BP: NORMAL MMHG
BH CV STRESS RECOVERY HR: 110 BPM
BH CV STRESS SPEED STAGE 1: 1.7
BH CV STRESS SPEED STAGE 2: 2.5
BH CV STRESS STAGE 1: 1
BH CV STRESS STAGE 2: 2
MAXIMAL PREDICTED HEART RATE: 198 BPM
PERCENT MAX PREDICTED HR: 88.89 %
STRESS BASELINE BP: NORMAL MMHG
STRESS BASELINE HR: 115 BPM
STRESS PERCENT HR: 105 %
STRESS POST ESTIMATED WORKLOAD: 7 METS
STRESS POST EXERCISE DUR MIN: 6 MIN
STRESS POST EXERCISE DUR SEC: 2 SEC
STRESS POST PEAK BP: NORMAL MMHG
STRESS POST PEAK HR: 176 BPM
STRESS TARGET HR: 168 BPM

## 2018-12-04 NOTE — TELEPHONE ENCOUNTER
Stress:  #1.  Below average exercise capacity with exaggerated heart rate and blood pressure responses distress compatible with physical deconditioning.     #2.  No elective cardiographic evidence of ischemia.     #3.  No stress induced dysrhythmia.      Waiting on echo results..

## 2018-12-05 LAB
BH CV ECHO MEAS - ACS: 1.1 CM
BH CV ECHO MEAS - AO MAX PG: 5.3 MMHG
BH CV ECHO MEAS - AO MEAN PG: 2.5 MMHG
BH CV ECHO MEAS - AO ROOT AREA (BSA CORRECTED): 1.1
BH CV ECHO MEAS - AO ROOT AREA: 3.6 CM^2
BH CV ECHO MEAS - AO ROOT DIAM: 2.1 CM
BH CV ECHO MEAS - AO V2 MAX: 114.9 CM/SEC
BH CV ECHO MEAS - AO V2 MEAN: 72 CM/SEC
BH CV ECHO MEAS - AO V2 VTI: 21.7 CM
BH CV ECHO MEAS - BSA(HAYCOCK): 2.1 M^2
BH CV ECHO MEAS - BSA: 2 M^2
BH CV ECHO MEAS - BZI_BMI: 35.3 KILOGRAMS/M^2
BH CV ECHO MEAS - BZI_METRIC_HEIGHT: 165.1 CM
BH CV ECHO MEAS - BZI_METRIC_WEIGHT: 96.2 KG
BH CV ECHO MEAS - EDV(CUBED): 86.9 ML
BH CV ECHO MEAS - EDV(TEICH): 89.1 ML
BH CV ECHO MEAS - EF(CUBED): 74.5 %
BH CV ECHO MEAS - EF(TEICH): 66.6 %
BH CV ECHO MEAS - ESV(CUBED): 22.1 ML
BH CV ECHO MEAS - ESV(TEICH): 29.8 ML
BH CV ECHO MEAS - FS: 36.6 %
BH CV ECHO MEAS - IVS/LVPW: 2
BH CV ECHO MEAS - IVSD: 1.1 CM
BH CV ECHO MEAS - LA DIMENSION: 3.3 CM
BH CV ECHO MEAS - LA/AO: 1.5
BH CV ECHO MEAS - LV IVRT: 0.09 SEC
BH CV ECHO MEAS - LV MASS(C)D: 123.3 GRAMS
BH CV ECHO MEAS - LV MASS(C)DI: 60.8 GRAMS/M^2
BH CV ECHO MEAS - LVIDD: 4.4 CM
BH CV ECHO MEAS - LVIDS: 2.8 CM
BH CV ECHO MEAS - LVPWD: 0.59 CM
BH CV ECHO MEAS - MITRAL HR: 127.5 BPM
BH CV ECHO MEAS - MITRAL R-R: 0.47 SEC
BH CV ECHO MEAS - MV A MAX VEL: 51.6 CM/SEC
BH CV ECHO MEAS - MV DEC SLOPE: 279.2 CM/SEC^2
BH CV ECHO MEAS - MV DEC TIME: 0.22 SEC
BH CV ECHO MEAS - MV E MAX VEL: 61.8 CM/SEC
BH CV ECHO MEAS - MV E/A: 1.2
BH CV ECHO MEAS - MV MAX PG: 3.4 MMHG
BH CV ECHO MEAS - MV MEAN PG: 1.3 MMHG
BH CV ECHO MEAS - MV V2 MAX: 92.2 CM/SEC
BH CV ECHO MEAS - MV V2 MEAN: 53.2 CM/SEC
BH CV ECHO MEAS - MV V2 VTI: 25.2 CM
BH CV ECHO MEAS - PA MAX PG: 2.9 MMHG
BH CV ECHO MEAS - PA MEAN PG: 1.6 MMHG
BH CV ECHO MEAS - PA V2 MAX: 84.4 CM/SEC
BH CV ECHO MEAS - PA V2 MEAN: 60.5 CM/SEC
BH CV ECHO MEAS - PA V2 VTI: 18.6 CM
BH CV ECHO MEAS - PULM. HR: 192.7 BPM
BH CV ECHO MEAS - PULM. R-R: 0.31 SEC
BH CV ECHO MEAS - RVDD: 3.6 CM
BH CV ECHO MEAS - RVSP: 36 MMHG
BH CV ECHO MEAS - SI(AO): 38.3 ML/M^2
BH CV ECHO MEAS - SI(CUBED): 31.9 ML/M^2
BH CV ECHO MEAS - SI(TEICH): 29.3 ML/M^2
BH CV ECHO MEAS - SV(AO): 77.6 ML
BH CV ECHO MEAS - SV(CUBED): 64.8 ML
BH CV ECHO MEAS - SV(TEICH): 59.3 ML
BH CV ECHO MEAS - TR MAX PG: 28
MAXIMAL PREDICTED HEART RATE: 198 BPM
STRESS TARGET HR: 168 BPM

## 2018-12-06 NOTE — TELEPHONE ENCOUNTER
Echo:  Estimated EF appears to be in the range of 56 - 60%.        Keep follow up appt on 1/21/19 carmina Wilburn

## 2019-01-22 ENCOUNTER — OFFICE VISIT (OUTPATIENT)
Dept: CARDIOLOGY | Facility: CLINIC | Age: 24
End: 2019-01-22

## 2019-01-22 VITALS
OXYGEN SATURATION: 96 % | HEART RATE: 94 BPM | HEIGHT: 65 IN | DIASTOLIC BLOOD PRESSURE: 82 MMHG | SYSTOLIC BLOOD PRESSURE: 123 MMHG | BODY MASS INDEX: 35.16 KG/M2 | WEIGHT: 211 LBS

## 2019-01-22 DIAGNOSIS — R06.02 SHORTNESS OF BREATH: ICD-10-CM

## 2019-01-22 DIAGNOSIS — R07.9 CHEST PAIN, UNSPECIFIED TYPE: Primary | ICD-10-CM

## 2019-01-22 DIAGNOSIS — R00.2 PALPITATIONS: ICD-10-CM

## 2019-01-22 DIAGNOSIS — I27.20 PULMONARY HYPERTENSION (HCC): ICD-10-CM

## 2019-01-22 PROCEDURE — 99213 OFFICE O/P EST LOW 20 MIN: CPT | Performed by: NURSE PRACTITIONER

## 2019-01-22 RX ORDER — RANITIDINE 150 MG/1
150 TABLET ORAL DAILY
COMMUNITY
End: 2021-09-13

## 2019-01-22 NOTE — PATIENT INSTRUCTIONS
"Fat and Cholesterol Restricted Diet  Getting too much fat and cholesterol in your diet may cause health problems. Following this diet helps keep your fat and cholesterol at normal levels. This can keep you from getting sick.  What types of fat should I choose?  · Choose monosaturated and polyunsaturated fats. These are found in foods such as olive oil, canola oil, flaxseeds, walnuts, almonds, and seeds.  · Eat more omega-3 fats. Good choices include salmon, mackerel, sardines, tuna, flaxseed oil, and ground flaxseeds.  · Limit saturated fats. These are in animal products such as meats, butter, and cream. They can also be in plant products such as palm oil, palm kernel oil, and coconut oil.  · Avoid foods with partially hydrogenated oils in them. These contain trans fats. Examples of foods that have trans fats are stick margarine, some tub margarines, cookies, crackers, and other baked goods.  What general guidelines do I need to follow?  · Check food labels. Look for the words \"trans fat\" and \"saturated fat.\"  · When preparing a meal:  ? Fill half of your plate with vegetables and green salads.  ? Fill one fourth of your plate with whole grains. Look for the word \"whole\" as the first word in the ingredient list.  ? Fill one fourth of your plate with lean protein foods.  · Eat more foods that have fiber, like apples, carrots, beans, peas, and barley.  · Eat more home-cooked foods. Eat less at restaurants and buffets.  · Limit or avoid alcohol.  · Limit foods high in starch and sugar.  · Limit fried foods.  · Cook foods without frying them. Baking, boiling, grilling, and broiling are all great options.  · Lose weight if you are overweight. Losing even a small amount of weight can help your overall health. It can also help prevent diseases such as diabetes and heart disease.  What foods can I eat?  Grains  Whole grains, such as whole wheat or whole grain breads, crackers, cereals, and pasta. Unsweetened oatmeal, " bulgur, barley, quinoa, or brown rice. Corn or whole wheat flour tortillas.  Vegetables  Fresh or frozen vegetables (raw, steamed, roasted, or grilled). Green salads.  Fruits  All fresh, canned (in natural juice), or frozen fruits.  Meat and Other Protein Products  Ground beef (85% or leaner), grass-fed beef, or beef trimmed of fat. Skinless chicken or turkey. Ground chicken or turkey. Pork trimmed of fat. All fish and seafood. Eggs. Dried beans, peas, or lentils. Unsalted nuts or seeds. Unsalted canned or dry beans.  Dairy  Low-fat dairy products, such as skim or 1% milk, 2% or reduced-fat cheeses, low-fat ricotta or cottage cheese, or plain low-fat yogurt.  Fats and Oils  Tub margarines without trans fats. Light or reduced-fat mayonnaise and salad dressings. Avocado. Olive, canola, sesame, or safflower oils. Natural peanut or almond butter (choose ones without added sugar and oil).  The items listed above may not be a complete list of recommended foods or beverages. Contact your dietitian for more options.  What foods are not recommended?  Grains  White bread. White pasta. White rice. Cornbread. Bagels, pastries, and croissants. Crackers that contain trans fat.  Vegetables  White potatoes. Corn. Creamed or fried vegetables. Vegetables in a cheese sauce.  Fruits  Dried fruits. Canned fruit in light or heavy syrup. Fruit juice.  Meat and Other Protein Products  Fatty cuts of meat. Ribs, chicken wings, guerra, sausage, bologna, salami, chitterlings, fatback, hot dogs, bratwurst, and packaged luncheon meats. Liver and organ meats.  Dairy  Whole or 2% milk, cream, half-and-half, and cream cheese. Whole milk cheeses. Whole-fat or sweetened yogurt. Full-fat cheeses. Nondairy creamers and whipped toppings. Processed cheese, cheese spreads, or cheese curds.  Sweets and Desserts  Corn syrup, sugars, honey, and molasses. Candy. Jam and jelly. Syrup. Sweetened cereals. Cookies, pies, cakes, donuts, muffins, and ice  cream.  Fats and Oils  Butter, stick margarine, lard, shortening, ghee, or guerra fat. Coconut, palm kernel, or palm oils.  Beverages  Alcohol. Sweetened drinks (such as sodas, lemonade, and fruit drinks or punches).  The items listed above may not be a complete list of foods and beverages to avoid. Contact your dietitian for more information.  This information is not intended to replace advice given to you by your health care provider. Make sure you discuss any questions you have with your health care provider.  Document Released: 06/18/2013 Document Revised: 08/24/2017 Document Reviewed: 03/19/2015  [employer] Interactive Patient Education © 2018 [employer]  BMI for Adults  Body mass index (BMI) is a number that is calculated from a person's weight and height. In most adults, the number is used to find how much of an adult's weight is made up of fat. BMI is not as accurate as a direct measure of body fat.  How is BMI calculated?  BMI is calculated by dividing weight in kilograms by height in meters squared. It can also be calculated by dividing weight in pounds by height in inches squared, then multiplying the resulting number by 703. Charts are available to help you find your BMI quickly and easily without doing this calculation.  How is BMI interpreted?  Health care professionals use BMI charts to identify whether an adult is underweight, at a normal weight, or overweight based on the following guidelines:  · Underweight: BMI less than 18.5.  · Normal weight: BMI between 18.5 and 24.9.  · Overweight: BMI between 25 and 29.9.  · Obese: BMI of 30 and above.    BMI is usually interpreted the same for males and females.  Weight includes both fat and muscle, so someone with a muscular build, such as an athlete, may have a BMI that is higher than 24.9. In cases like these, BMI may not accurately depict body fat. To determine if excess body fat is the cause of a BMI of 25 or higher, further assessments may need to be  done by a health care provider.  Why is BMI a useful tool?  BMI is used to identify a possible weight problem that may be related to a medical problem or may increase the risk for medical problems. BMI can also be used to promote changes to reach a healthy weight.  This information is not intended to replace advice given to you by your health care provider. Make sure you discuss any questions you have with your health care provider.  Document Released: 08/29/2005 Document Revised: 04/27/2017 Document Reviewed: 05/15/2015  [employer] Interactive Patient Education © 2018 [employer]    Pulmonary Hypertension  Pulmonary hypertension is high blood pressure within the arteries in your lungs (pulmonary arteries). It is different than having high blood pressure elsewhere in your body, such as blood pressure that is measured with a blood pressure cuff. Pulmonary hypertension makes it harder for blood to flow through the lungs. As a result, the heart must work harder to pump blood through the lungs, and it may be harder for you to breathe. Over time, this can weaken the heart muscle. Pulmonary hypertension is a serious condition and it can be fatal.  What are the causes?  Many different medical conditions can cause pulmonary hypertension. Pulmonary hypertension can be categorized by cause into five groups:  Group 1  Pulmonary hypertension that is caused by abnormal growth of small blood vessels in the lungs (pulmonary arterial hypertension). The abnormal blood vessel growth may have no known cause, or it may be:  · Passed along from a parent (hereditary).  · Caused by another disease, such as a connective tissue disease (including lupus or scleroderma) or HIV.  · Caused by certain drugs or toxins.    Group 2  Pulmonary hypertension that is caused by weakness of the main chamber of the heart (left ventricle) or heart valve disease.  Group 3  Pulmonary hypertension that is caused by lung disease or low oxygen levels. Causes in  this group include:  · Emphysema or chronic obstructive pulmonary disease (COPD).  · Untreated sleep apnea.  · Pulmonary fibrosis.    Group 4  Pulmonary hypertension that is caused by blood clots in the lungs (pulmonary emboli).  Group 5  Other causes of pulmonary hypertension, such as sickle cell anemia, or a mix of multiple causes.  What are the signs or symptoms?  Symptoms of this condition include:  · Shortness of breath. You may notice shortness of breath with:  ? Activity, such as walking.  ? No activity.  · Tiredness and fatigue.  · Dizziness or fainting.  · Rapid heartbeat or feeling your heart flutter or skip a beat (palpitations).  · Neck vein enlargement.  · Bluish color to your lips and fingertips.    How is this diagnosed?  This condition may be diagnosed by:  · Chest X-ray.  · Arterial blood gases. This test checks the acidity of your blood as well as your blood oxygen and carbon dioxide levels.  · CT scan. This test can provide detailed images of your lungs.  · Pulmonary function test. This test measures how much air your lungs can hold. It also tests how well air moves in and out of your lungs.  · Electrocardiogram (ECG). This test traces the electrical activity of your heart.  · Echocardiogram. This test is used to look at your heart in motion and check how it is functioning.  · Heart catheterization. This test can measure the pressure in your pulmonary artery and the right side of your heart.  · Lung biopsy. This procedure involves checking a sample of lung tissue to find underlying causes.    How is this treated?  There is no cure for pulmonary hypertension, but treatment can help to relieve symptoms and slow the progress of the condition. Treatment can involve:  · Medicines, such as:  ? Blood pressure medicines.  ? Medicines to relax (dilate) the pulmonary blood vessels.  ? Water pills to get rid of extra fluid (diuretic medicines).  ? Blood-thinning medicines.  · Surgery. For severe pulmonary  hypertension that does not respond to medical treatment, heart-lung or lung transplant may be needed.    Follow these instructions at home:  · Take medicines only as directed by your health care provider. These include over-the-counter medicines and prescription medicines. Take all medicines exactly as instructed. Do not change or stop medicines without first checking with your health care provider.  · Do not smoke. If you need help quitting, ask your health care provider.  · Eat a healthy diet.  · Limit your salt (sodium) intake to less than 2,300 mg per day.  · Stay as active as possible. Exercise as directed by your health care provider. Talk with your health care provider about what type of exercise is safe for you.  · Avoid high altitudes.  · Avoid hot tubs and saunas.  · Avoid becoming pregnant, if this applies. Talk with your health care provider about safe methods of birth control.  · Keep all follow-up visits as directed by your health care provider. This is important.  Get help right away if:  · You have severe shortness of breath.  · You develop chest pain or pressure in your chest.  · You cough up blood.  · You develop swelling of your feet or legs.  · You have a significant increase in weight within 1-2 days.  This information is not intended to replace advice given to you by your health care provider. Make sure you discuss any questions you have with your health care provider.  Document Released: 10/14/2008 Document Revised: 07/07/2017 Document Reviewed: 06/09/2014  [email or web address] Interactive Patient Education © 2018 [email or web address]

## 2019-01-22 NOTE — PROGRESS NOTES
Subjective   Melissa Kramer is a 23 y.o. female     Chief Complaint   Patient presents with   • Follow-up     Pt in office for 9 wk follow up    • Chest Pain   • Palpitations       HPI    PROBLEM LIST:     1. Palpitations   1.1 event monitor 4/17-5/16/18-normal sinus rhythm, sinus tach  2. Shortness of breath   2.1 echo from [facility] 3/2/18 - LVEF 60%, normal diastolic dysfunction, trace MR, borderline pulmonary HTNsystolic pressure estimated 51 mmHg.   2.2 Echo 12/3/18 - EF 56-60%; trace to mild MR, PA 37  3. Chest pain   3.1 Stress Test 12/3/18 - no ischemia; low risk   4. Dizziness     Patient is a 23-year-old female who presents today for follow-up on testing.  She continues to have left anterior sharp pain that will last a few minutes or she will get a dull pain that lingers.  She states the pain will radiate into her left arm.  She says she does get shortness of breath, occasionally some nausea and lightheadedness.  She says it doesn't matter if she sitting or if she is active but if she stops if she is active and it will dull some but it does not completely resolve.  She says she has fluttering all the time.  She does sometimes it feels like it's fast.  She says she does have dizziness on occasion but it is very random.  She denies any presyncope, syncope, orthopnea or PND.  She does get bilateral lower extremity edema.  She says she does have shortness of breath which has been more here with the cold weather.  She says it is with any activity.    We went over stress and echo.    Current Outpatient Medications   Medication Sig Dispense Refill   • Omeprazole 20 MG tablet delayed-release Take 20 mg by mouth Daily.     • raNITIdine (ZANTAC) 150 MG tablet Take 150 mg by mouth Daily.       No current facility-administered medications for this visit.        ALLERGIES    Keflex [cephalexin]    History reviewed. No pertinent past medical history.    Social History     Socioeconomic History   • Marital status:       "Spouse name: Not on file   • Number of children: Not on file   • Years of education: Not on file   • Highest education level: Not on file   Social Needs   • Financial resource strain: Not on file   • Food insecurity - worry: Not on file   • Food insecurity - inability: Not on file   • Transportation needs - medical: Not on file   • Transportation needs - non-medical: Not on file   Occupational History   • Not on file   Tobacco Use   • Smoking status: Never Smoker   • Smokeless tobacco: Never Used   Substance and Sexual Activity   • Alcohol use: No   • Drug use: No   • Sexual activity: Yes     Partners: Male     Birth control/protection: Implant   Other Topics Concern   • Not on file   Social History Narrative   • Not on file       Family History   Problem Relation Age of Onset   • Clotting disorder Mother         acute DVt    • Cancer Mother    • Heart disease Mother         cardiac arrest   • Other Mother         respiratory failure    • No Known Problems Father    • Heart disease Maternal Grandfather    • Heart attack Maternal Grandfather        Review of Systems   Constitutional: Positive for fatigue. Negative for diaphoresis.   HENT: Positive for sore throat (Pt states that she's seeing ENT ). Negative for congestion and rhinorrhea.    Eyes: Positive for visual disturbance (glasses at night ).   Respiratory: Positive for shortness of breath (Pt states that she's having difficulty breathing, unsure if it's related to the cold weather; with any activity ). Negative for chest tightness.    Cardiovascular: Positive for chest pain (CP thats's a little to the left side, describes it as sharp for a few min, or lingering dull pain. Increased SoA, radiates to left arm; several times a day, does not matter what she is doing.  if she is active and sits it will dull it, but not fully quit), palpitations (\"all the time\"; separate from CP; fluttering sometimes fast) and leg swelling (sometimes ).   Gastrointestinal: Positive " "for abdominal pain (from cysts, OB looking into it ), constipation (States she has one episode of hard stool, states she almost went to ER ), diarrhea (Had one bad episode recently, been lingering since she was badly constipated) and nausea (some with CP, but no often ). Negative for vomiting.   Endocrine: Positive for cold intolerance. Negative for heat intolerance.   Genitourinary: Positive for urgency (Pt states that if she doesn't urinate quickly enough that she gets pain around urethra). Negative for difficulty urinating, dysuria and frequency.   Musculoskeletal: Positive for neck pain (lingering neck pain- PCP gave her a shot to help w/ the pain, no help ). Negative for back pain.   Skin: Negative for rash and wound.   Allergic/Immunologic: Positive for environmental allergies (seasonal ). Negative for food allergies.   Neurological: Positive for dizziness (occasionally- can be random. States she moved her eyes the other day and got dizzy ), light-headedness (with CP  ) and headaches (constant HA since neck pain started ). Negative for syncope, weakness and numbness.   Hematological: Bruises/bleeds easily (bruise).   Psychiatric/Behavioral: Positive for sleep disturbance (Pt states that she has to sleep elevated, which makes it hard to sleep. Denies issues breathing while she sleeps. Does snore. ).       Objective   /82   Pulse 94   Ht 165 cm (64.96\")   Wt 95.7 kg (211 lb)   SpO2 96%   BMI 35.16 kg/m²   Vitals:    01/22/19 1316   BP: 123/82   Pulse: 94   SpO2: 96%   Weight: 95.7 kg (211 lb)   Height: 165 cm (64.96\")      Lab Results (most recent)     None        Physical Exam   Constitutional: She is oriented to person, place, and time. Vital signs are normal. She appears well-developed and well-nourished. She is active and cooperative.   HENT:   Head: Normocephalic.   Eyes: Lids are normal.   Near sighted, uses glasses    Neck: Normal carotid pulses, no hepatojugular reflux and no JVD present. " Carotid bruit is not present.   Cardiovascular: Normal rate, regular rhythm and normal heart sounds.   Pulses:       Radial pulses are 2+ on the right side, and 2+ on the left side.        Dorsalis pedis pulses are 2+ on the right side, and 2+ on the left side.        Posterior tibial pulses are 2+ on the right side, and 2+ on the left side.   No edema BLE.     Pulmonary/Chest: Effort normal and breath sounds normal.   Abdominal: Normal appearance and bowel sounds are normal.   Neurological: She is alert and oriented to person, place, and time.   Skin: Skin is warm, dry and intact.   Psychiatric: She has a normal mood and affect. Her speech is normal and behavior is normal. Judgment and thought content normal. Cognition and memory are normal.       Procedure   Procedures         Assessment/Plan      Diagnosis Plan   1. Chest pain, unspecified type     2. Palpitations     3. Shortness of breath  Ambulatory Referral to Pulmonology   4. Pulmonary hypertension (CMS/[facility])  Ambulatory Referral to Pulmonology       Return in about 3 months (around 4/22/2019).    Chest pain-atypical with negative ischemia workup.  Palpitations-patient does not want to take any medication at this time.  Shortness of breath/mild pulmonary hypertension-patient will be referred to Dr. Hooker.  She'll continue her medication regimen.  She'll follow-up in 3 months or sooner if any changes.           Patient's Body mass index is 35.16 kg/m². BMI is above normal parameters. Recommendations include: educational material.      Electronically signed by:

## 2019-06-25 ENCOUNTER — APPOINTMENT (OUTPATIENT)
Dept: WOMENS IMAGING | Facility: HOSPITAL | Age: 24
End: 2019-06-25

## 2019-06-25 PROCEDURE — 76641 ULTRASOUND BREAST COMPLETE: CPT | Performed by: RADIOLOGY

## 2019-07-03 ENCOUNTER — APPOINTMENT (OUTPATIENT)
Dept: WOMENS IMAGING | Facility: HOSPITAL | Age: 24
End: 2019-07-03

## 2019-07-03 PROCEDURE — 19083 BX BREAST 1ST LESION US IMAG: CPT | Performed by: RADIOLOGY

## 2019-11-05 ENCOUNTER — OFFICE VISIT (OUTPATIENT)
Dept: CARDIOLOGY | Facility: CLINIC | Age: 24
End: 2019-11-05

## 2019-11-05 VITALS
DIASTOLIC BLOOD PRESSURE: 88 MMHG | HEART RATE: 76 BPM | BODY MASS INDEX: 35.89 KG/M2 | WEIGHT: 215.4 LBS | HEIGHT: 65 IN | SYSTOLIC BLOOD PRESSURE: 121 MMHG | OXYGEN SATURATION: 99 %

## 2019-11-05 DIAGNOSIS — R07.9 CHEST PAIN, UNSPECIFIED TYPE: Primary | ICD-10-CM

## 2019-11-05 DIAGNOSIS — R06.02 SHORTNESS OF BREATH: ICD-10-CM

## 2019-11-05 DIAGNOSIS — R42 DIZZINESS: ICD-10-CM

## 2019-11-05 DIAGNOSIS — R00.2 PALPITATIONS: ICD-10-CM

## 2019-11-05 PROCEDURE — 99214 OFFICE O/P EST MOD 30 MIN: CPT | Performed by: NURSE PRACTITIONER

## 2019-11-05 NOTE — PROGRESS NOTES
Subjective   Melissa Kramer is a 24 y.o. female     Chief Complaint   Patient presents with   • Follow-up       HPI    PROBLEM LIST:     1. Palpitations   1.1 event monitor 4/17-5/16/18-normal sinus rhythm, sinus tach  2. Shortness of breath   2.1 echo from [facility] 3/2/18 - LVEF 60%, normal diastolic dysfunction, trace MR, borderline pulmonary HTNsystolic pressure estimated 51 mmHg.   2.2 Echo 12/3/18 - EF 56-60%; trace to mild MR, PA 37  3. Chest pain   3.1 Stress Test 12/3/18 - no ischemia; low risk   4. Dizziness     Patient is a 24-year-old female who presents today for follow-up.  She has been to the ER 5 times in the past month due to dull pressure left anterior that sometimes a sharp pain that will radiate into her left shoulder.  She says sometimes it goes into her jaw.  She says she does become short of breath, diaphoretic, lightheaded and nauseated when it occurs.  She says that it can occur anytime.  She says she has palpitations on occasion with it and when she does it is much worse.  She says she does get dizzy with increased shortness of breath.  She denies any presyncope, syncope, orthopnea PND or edema.  She says that she is short of breath and that her inhaler will typically help.  Patient has had a CT of the head that was negative, a gallbladder ultrasound and a HIDA scan which were also negative.  She is going to see a surgeon now and they are still considering removing her gallbladder.    Current Outpatient Medications on File Prior to Visit   Medication Sig Dispense Refill   • Norethin-Eth Estrad-Fe Biphas (LO LOESTRIN FE PO) Take  by mouth.     • Omeprazole 20 MG tablet delayed-release Take 20 mg by mouth Daily.     • raNITIdine (ZANTAC) 150 MG tablet Take 150 mg by mouth Daily.       No current facility-administered medications on file prior to visit.        ALLERGIES    Keflex [cephalexin]    Past Medical History:   Diagnosis Date   • Asthma    • Mitral valve regurgitation    • Palpitations    •  "Pulmonary HTN (CMS/HCC)    • TMJ (dislocation of temporomandibular joint)        Social History     Socioeconomic History   • Marital status:      Spouse name: Not on file   • Number of children: Not on file   • Years of education: Not on file   • Highest education level: Not on file   Tobacco Use   • Smoking status: Never Smoker   • Smokeless tobacco: Never Used   Substance and Sexual Activity   • Alcohol use: No   • Drug use: No   • Sexual activity: Yes     Partners: Male     Birth control/protection: Implant       Family History   Problem Relation Age of Onset   • Clotting disorder Mother         acute DVt    • Cancer Mother    • Heart disease Mother         cardiac arrest   • Other Mother         respiratory failure    • No Known Problems Father    • Heart disease Maternal Grandfather    • Heart attack Maternal Grandfather        Review of Systems   Constitutional: Positive for diaphoresis (with CP ) and fatigue.   HENT: Negative for congestion, rhinorrhea and sore throat.    Eyes: Positive for visual disturbance (glasses PRN ).   Respiratory: Positive for chest tightness and shortness of breath (C/o increased SoA. States that usually she can use her inhailer and it goes away, states it's not helping anymore; with CP ).    Cardiovascular: Positive for chest pain (Pt states she's gone to [facility] several times c/o CP, roughly 5x, states that they do some tests and say everything is normal and send her home. States she was told it's anxiety. Pt c/o left sided CP that radiates into arm and sometimes jaw. Sometimes dull.  ) and palpitations (\"all the time\" ). Negative for leg swelling.   Gastrointestinal: Positive for abdominal pain (gallbladder issues ), constipation (back and forth ), diarrhea (back and forth ) and nausea (due to gallbladder; sometime with CP ). Negative for blood in stool and vomiting.   Endocrine: Positive for cold intolerance (usually cold ). Negative for heat intolerance.        C/o hot " "flashes    Genitourinary: Negative for difficulty urinating, dysuria, frequency, hematuria and urgency.   Musculoskeletal: Negative for back pain and neck pain.   Skin: Negative for rash and wound.   Allergic/Immunologic: Positive for environmental allergies (seasonal ). Negative for food allergies.   Neurological: Positive for dizziness (w/ increased SoA ), light-headedness (with CP ) and headaches (occasionally ). Negative for syncope, weakness and numbness.   Hematological: Bruises/bleeds easily (bruises).   Psychiatric/Behavioral: Negative for sleep disturbance (Sleeps okay if 3 year old lets her ). The patient is not nervous/anxious (Denies feeling anxious. ).        Objective   /88   Pulse 76   Ht 165.1 cm (65\")   Wt 97.7 kg (215 lb 6.4 oz)   SpO2 99%   BMI 35.84 kg/m²   Vitals:    11/05/19 1005   BP: 121/88   Pulse: 76   SpO2: 99%   Weight: 97.7 kg (215 lb 6.4 oz)   Height: 165.1 cm (65\")      Lab Results (most recent)     None        Physical Exam   Constitutional: She is oriented to person, place, and time. Vital signs are normal. She appears well-developed and well-nourished. She is active and cooperative.   HENT:   Head: Normocephalic.   Eyes: Lids are normal.   Neck: Normal carotid pulses, no hepatojugular reflux and no JVD present. Carotid bruit is not present.   Cardiovascular: Normal rate and regular rhythm.   Murmur heard.   Systolic (LUSB soft ) murmur is present.  Pulses:       Radial pulses are 2+ on the right side, and 2+ on the left side.        Dorsalis pedis pulses are 2+ on the right side, and 2+ on the left side.        Posterior tibial pulses are 2+ on the right side, and 2+ on the left side.   No edema BLE.   Pulmonary/Chest: Effort normal and breath sounds normal.   Abdominal: Normal appearance and bowel sounds are normal.   Neurological: She is alert and oriented to person, place, and time.   Skin: Skin is warm, dry and intact.   Psychiatric: She has a normal mood and affect. " Her speech is normal and behavior is normal. Judgment and thought content normal. Cognition and memory are normal.       Procedure   Procedures         Assessment/Plan      Diagnosis Plan   1. Chest pain, unspecified type  CT Angiogram Heart With 3D Image   2. Palpitations  CT Angiogram Heart With 3D Image    Cardiac Event Monitor   3. Shortness of breath  CT Angiogram Heart With 3D Image   4. Dizziness  CT Angiogram Heart With 3D Image    Cardiac Event Monitor       Return in about 14 weeks (around 2/11/2020).    Chest pain/palpitations/shortness of breath/dizziness-patient will have a CT Valerie of the heart.  She will wear an event monitor again but for 30 days.  She will continue her medication regimen for now.  She will follow-up in 14 weeks or sooner if any changes.           Patient's Body mass index is 35.84 kg/m². BMI is above normal parameters. Recommendations include: educational material.      Electronically signed by:

## 2019-11-05 NOTE — PATIENT INSTRUCTIONS
"Fat and Cholesterol Restricted Eating Plan  Getting too much fat and cholesterol in your diet may cause health problems. Choosing the right foods helps keep your fat and cholesterol at normal levels. This can keep you from getting certain diseases.  Your doctor may recommend an eating plan that includes:  · Total fat: ______% or less of total calories a day.  · Saturated fat: ______% or less of total calories a day.  · Cholesterol: less than _________mg a day.  · Fiber: ______g a day.  What are tips for following this plan?  Meal planning  · At meals, divide your plate into four equal parts:  ? Fill one-half of your plate with vegetables and green salads.  ? Fill one-fourth of your plate with whole grains.  ? Fill one-fourth of your plate with low-fat (lean) protein foods.  · Eat fish that is high in omega-3 fats at least two times a week. This includes mackerel, tuna, sardines, and salmon.  · Eat foods that are high in fiber, such as whole grains, beans, apples, broccoli, carrots, peas, and barley.  General tips    · Work with your doctor to lose weight if you need to.  · Avoid:  ? Foods with added sugar.  ? Fried foods.  ? Foods with partially hydrogenated oils.  · Limit alcohol intake to no more than 1 drink a day for nonpregnant women and 2 drinks a day for men. One drink equals 12 oz of beer, 5 oz of wine, or 1½ oz of hard liquor.  Reading food labels  · Check food labels for:  ? Trans fats.  ? Partially hydrogenated oils.  ? Saturated fat (g) in each serving.  ? Cholesterol (mg) in each serving.  ? Fiber (g) in each serving.  · Choose foods with healthy fats, such as:  ? Monounsaturated fats.  ? Polyunsaturated fats.  ? Omega-3 fats.  · Choose grain products that have whole grains. Look for the word \"whole\" as the first word in the ingredient list.  Cooking  · Cook foods using low-fat methods. These include baking, boiling, grilling, and broiling.  · Eat more home-cooked foods. Eat at restaurants and buffets " less often.  · Avoid cooking using saturated fats, such as butter, cream, palm oil, palm kernel oil, and coconut oil.  Recommended foods    Fruits  · All fresh, canned (in natural juice), or frozen fruits.  Vegetables  · Fresh or frozen vegetables (raw, steamed, roasted, or grilled). Green salads.  Grains  · Whole grains, such as whole wheat or whole grain breads, crackers, cereals, and pasta. Unsweetened oatmeal, bulgur, barley, quinoa, or brown rice. Corn or whole wheat flour tortillas.  Meats and other protein foods  · Ground beef (85% or leaner), grass-fed beef, or beef trimmed of fat. Skinless chicken or turkey. Ground chicken or turkey. Pork trimmed of fat. All fish and seafood. Egg whites. Dried beans, peas, or lentils. Unsalted nuts or seeds. Unsalted canned beans. Nut butters without added sugar or oil.  Dairy  · Low-fat or nonfat dairy products, such as skim or 1% milk, 2% or reduced-fat cheeses, low-fat and fat-free ricotta or cottage cheese, or plain low-fat and nonfat yogurt.  Fats and oils  · Tub margarine without trans fats. Light or reduced-fat mayonnaise and salad dressings. Avocado. Olive, canola, sesame, or safflower oils.  The items listed above may not be a complete list of foods and beverages you can eat. Contact a dietitian for more information.  Foods to avoid  Fruits  · Canned fruit in heavy syrup. Fruit in cream or butter sauce. Fried fruit.  Vegetables  · Vegetables cooked in cheese, cream, or butter sauce. Fried vegetables.  Grains  · White bread. White pasta. White rice. Cornbread. Bagels, pastries, and croissants. Crackers and snack foods that contain trans fat and hydrogenated oils.  Meats and other protein foods  · Fatty cuts of meat. Ribs, chicken wings, guerra, sausage, bologna, salami, chitterlings, fatback, hot dogs, bratwurst, and packaged lunch meats. Liver and organ meats. Whole eggs and egg yolks. Chicken and turkey with skin. Fried meat.  Dairy  · Whole or 2% milk, cream,  half-and-half, and cream cheese. Whole milk cheeses. Whole-fat or sweetened yogurt. Full-fat cheeses. Nondairy creamers and whipped toppings. Processed cheese, cheese spreads, and cheese curds.  Beverages  · Alcohol. Sugar-sweetened drinks such as sodas, lemonade, and fruit drinks.  Fats and oils  · Butter, stick margarine, lard, shortening, ghee, or guerra fat. Coconut, palm kernel, and palm oils.  Sweets and desserts  · Corn syrup, sugars, honey, and molasses. Candy. Jam and jelly. Syrup. Sweetened cereals. Cookies, pies, cakes, donuts, muffins, and ice cream.  The items listed above may not be a complete list of foods and beverages you should avoid. Contact a dietitian for more information.  Summary  · Choosing the right foods helps keep your fat and cholesterol at normal levels. This can keep you from getting certain diseases.  · At meals, fill one-half of your plate with vegetables and green salads.  · Eat high-fiber foods, like whole grains, beans, apples, carrots, peas, and barley.  · Limit added sugar, saturated fats, alcohol, and fried foods.  This information is not intended to replace advice given to you by your health care provider. Make sure you discuss any questions you have with your health care provider.  Document Released: 06/18/2013 Document Revised: 08/21/2019 Document Reviewed: 09/04/2018  [employer] Interactive Patient Education © 2019 [employer]  BMI for Adults    Body mass index (BMI) is a number that is calculated from a person's weight and height. BMI may help to estimate how much of a person's weight is composed of fat. BMI can help identify those who may be at higher risk for certain medical problems.  How is BMI used with adults?  BMI is used as a screening tool to identify possible weight problems. It is used to check whether a person is obese, overweight, healthy weight, or underweight.  How is BMI calculated?  BMI measures your weight and compares it to your height. This can be done  "either in English ([place] or metric measurements. Note that charts are available to help you find your BMI quickly and easily without having to do these calculations yourself.  To calculate your BMI in English (U.[place] measurements, your health care provider will:  1. Measure your weight in pounds (lb).  2. Multiply the number of pounds by 703.  ? For example, for a person who weighs 180 lb, multiply that number by 703, which equals 126,540.  3. Measure your height in inches (in). Then multiply that number by itself to get a measurement called \"inches squared.\"  ? For example, for a person who is 70 in tall, the \"inches squared\" measurement is 70 in x 70 in, which equals 4900 inches squared.  4. Divide the total from Step 2 (number of lb x 703) by the total from Step 3 (inches squared): 126,540 ÷ 4900 = 25.8. This is your BMI.  To calculate your BMI in metric measurements, your health care provider will:  1. Measure your weight in kilograms (kg).  2. Measure your height in meters (m). Then multiply that number by itself to get a measurement called \"meters squared.\"  ? For example, for a person who is 1.75 m tall, the \"meters squared\" measurement is 1.75 m x 1.75 m, which is equal to 3.1 meters squared.  3. Divide the number of kilograms (your weight) by the meters squared number. In this example: 70 ÷ 3.1 = 22.6. This is your BMI.  How is BMI interpreted?  To interpret your results, your health care provider will use BMI charts to identify whether you are underweight, normal weight, overweight, or obese. The following guidelines will be used:  · Underweight: BMI less than 18.5.  · Normal weight: BMI between 18.5 and 24.9.  · Overweight: BMI between 25 and 29.9.  · Obese: BMI of 30 and above.  Please note:  · Weight includes both fat and muscle, so someone with a muscular build, such as an athlete, may have a BMI that is higher than 24.9. In cases like these, BMI is not an accurate measure of body fat.  · To determine " if excess body fat is the cause of a BMI of 25 or higher, further assessments may need to be done by a health care provider.  · BMI is usually interpreted in the same way for men and women.  Why is BMI a useful tool?  BMI is useful in two ways:  · Identifying a weight problem that may be related to a medical condition, or that may increase the risk for medical problems.  · Promoting lifestyle and diet changes in order to reach a healthy weight.  Summary  · Body mass index (BMI) is a number that is calculated from a person's weight and height.  · BMI may help to estimate how much of a person's weight is composed of fat. BMI can help identify those who may be at higher risk for certain medical problems.  · BMI can be measured using English measurements or metric measurements.  · To interpret your results, your health care provider will use BMI charts to identify whether you are underweight, normal weight, overweight, or obese.  This information is not intended to replace advice given to you by your health care provider. Make sure you discuss any questions you have with your health care provider.  Document Released: 08/29/2005 Document Revised: 10/31/2018 Document Reviewed: 10/31/2018  [employer] Interactive Patient Education © 2019 [employer]

## 2019-11-12 ENCOUNTER — TELEPHONE (OUTPATIENT)
Dept: CARDIOLOGY | Facility: CLINIC | Age: 24
End: 2019-11-12

## 2019-11-12 RX ORDER — METOPROLOL TARTRATE 100 MG/1
TABLET ORAL
Qty: 2 TABLET | Refills: 0 | Status: SHIPPED | OUTPATIENT
Start: 2019-11-12 | End: 2021-09-13

## 2019-11-12 NOTE — TELEPHONE ENCOUNTER
Mariana Oshea Emily             Pt scheduled for cardiac ct at [facility] as she has their insurance. They state that the premeds need to be sent in for her to have it performed there.      Pt needs to P/U the Metoprolol and bring her nitro to scan.       I informed pt of the above, she verbalized understanding. I asked pt if the hospital gave her instructions, she stated they told her we would. I asked if she could P/U her instructions. She said to try and drop them off at [facility], where she works.     Informed pt that we can fax her paperwork and directions. She stated she would call later w/ fax number to send the paperwork.

## 2019-11-26 ENCOUNTER — TELEPHONE (OUTPATIENT)
Dept: CARDIOLOGY | Facility: CLINIC | Age: 24
End: 2019-11-26

## 2019-11-26 NOTE — TELEPHONE ENCOUNTER
PATIENT AWARE CARDIAC CTA NEG. FOR STENOSIS. LOGAN MCKEON        ----- Message from Tyra Jones sent at 11/26/2019 12:57 PM EST -----      ----- Message -----  From: Akila Cuadra APRN  Sent: 11/26/2019  12:26 PM  To: Tyra Jones    Please advise patient.

## 2019-12-17 ENCOUNTER — TELEPHONE (OUTPATIENT)
Dept: CARDIOLOGY | Facility: CLINIC | Age: 24
End: 2019-12-17

## 2019-12-17 NOTE — TELEPHONE ENCOUNTER
Received pt's monitor results, baseline  BPM. 0 critical, 0 serious, and 8 stable events.     Per Akila, keep follow up appt on 2/13/2020.         Informed pt of her monitor report, she verbalized understanding.

## 2020-02-21 ENCOUNTER — TELEPHONE (OUTPATIENT)
Dept: CARDIOLOGY | Facility: CLINIC | Age: 25
End: 2020-02-21

## 2020-05-13 ENCOUNTER — APPOINTMENT (OUTPATIENT)
Dept: WOMENS IMAGING | Facility: HOSPITAL | Age: 25
End: 2020-05-13

## 2020-05-13 PROCEDURE — 76641 ULTRASOUND BREAST COMPLETE: CPT | Performed by: RADIOLOGY

## 2020-05-19 ENCOUNTER — APPOINTMENT (OUTPATIENT)
Dept: WOMENS IMAGING | Facility: HOSPITAL | Age: 25
End: 2020-05-19

## 2020-05-19 PROCEDURE — 19083 BX BREAST 1ST LESION US IMAG: CPT | Performed by: RADIOLOGY

## 2021-09-07 ENCOUNTER — TELEPHONE (OUTPATIENT)
Dept: CARDIOLOGY | Facility: CLINIC | Age: 26
End: 2021-09-07

## 2021-09-07 ENCOUNTER — OFFICE VISIT (OUTPATIENT)
Dept: CARDIOLOGY | Facility: CLINIC | Age: 26
End: 2021-09-07

## 2021-09-07 ENCOUNTER — LAB (OUTPATIENT)
Dept: CARDIOLOGY | Facility: CLINIC | Age: 26
End: 2021-09-07

## 2021-09-07 VITALS
OXYGEN SATURATION: 96 % | SYSTOLIC BLOOD PRESSURE: 125 MMHG | HEIGHT: 65 IN | HEART RATE: 76 BPM | WEIGHT: 230 LBS | BODY MASS INDEX: 38.32 KG/M2 | TEMPERATURE: 97.5 F | DIASTOLIC BLOOD PRESSURE: 86 MMHG

## 2021-09-07 DIAGNOSIS — R07.89 OTHER CHEST PAIN: ICD-10-CM

## 2021-09-07 DIAGNOSIS — R00.2 PALPITATIONS: ICD-10-CM

## 2021-09-07 DIAGNOSIS — R06.02 SHORTNESS OF BREATH: ICD-10-CM

## 2021-09-07 DIAGNOSIS — Z86.16 HISTORY OF COVID-19: ICD-10-CM

## 2021-09-07 DIAGNOSIS — R60.9 PERIPHERAL EDEMA: ICD-10-CM

## 2021-09-07 DIAGNOSIS — R07.89 OTHER CHEST PAIN: Primary | ICD-10-CM

## 2021-09-07 DIAGNOSIS — I27.20 PULMONARY HYPERTENSION (HCC): ICD-10-CM

## 2021-09-07 DIAGNOSIS — R42 DIZZINESS: ICD-10-CM

## 2021-09-07 PROBLEM — R60.0 PERIPHERAL EDEMA: Status: ACTIVE | Noted: 2021-09-07

## 2021-09-07 LAB
ALBUMIN SERPL-MCNC: 3.86 G/DL (ref 3.5–5.2)
ALBUMIN/GLOB SERPL: 1.2 G/DL
ALP SERPL-CCNC: 54 U/L (ref 39–117)
ALT SERPL W P-5'-P-CCNC: 11 U/L (ref 1–33)
ANION GAP SERPL CALCULATED.3IONS-SCNC: 11.6 MMOL/L (ref 5–15)
AST SERPL-CCNC: 13 U/L (ref 1–32)
BASOPHILS # BLD AUTO: 0.12 10*3/MM3 (ref 0–0.2)
BASOPHILS NFR BLD AUTO: 1.2 % (ref 0–1.5)
BILIRUB SERPL-MCNC: 0.2 MG/DL (ref 0–1.2)
BUN SERPL-MCNC: 7 MG/DL (ref 6–20)
BUN/CREAT SERPL: 10.6 (ref 7–25)
CALCIUM SPEC-SCNC: 9.9 MG/DL (ref 8.6–10.5)
CHLORIDE SERPL-SCNC: 102 MMOL/L (ref 98–107)
CHOLEST SERPL-MCNC: 240 MG/DL (ref 0–200)
CO2 SERPL-SCNC: 23.4 MMOL/L (ref 22–29)
CREAT SERPL-MCNC: 0.66 MG/DL (ref 0.57–1)
DEPRECATED RDW RBC AUTO: 43.5 FL (ref 37–54)
EOSINOPHIL # BLD AUTO: 0.41 10*3/MM3 (ref 0–0.4)
EOSINOPHIL NFR BLD AUTO: 4.2 % (ref 0.3–6.2)
ERYTHROCYTE [DISTWIDTH] IN BLOOD BY AUTOMATED COUNT: 12.4 % (ref 12.3–15.4)
GFR SERPL CREATININE-BSD FRML MDRD: 110 ML/MIN/1.73
GLOBULIN UR ELPH-MCNC: 3.2 GM/DL
GLUCOSE SERPL-MCNC: 95 MG/DL (ref 65–99)
HCT VFR BLD AUTO: 43.3 % (ref 34–46.6)
HDLC SERPL-MCNC: 41 MG/DL (ref 40–60)
HGB BLD-MCNC: 13.7 G/DL (ref 12–15.9)
IMM GRANULOCYTES # BLD AUTO: 0.03 10*3/MM3 (ref 0–0.05)
IMM GRANULOCYTES NFR BLD AUTO: 0.3 % (ref 0–0.5)
LDLC SERPL CALC-MCNC: 137 MG/DL (ref 0–100)
LDLC/HDLC SERPL: 3.18 {RATIO}
LYMPHOCYTES # BLD AUTO: 2.82 10*3/MM3 (ref 0.7–3.1)
LYMPHOCYTES NFR BLD AUTO: 28.9 % (ref 19.6–45.3)
MAGNESIUM SERPL-MCNC: 1.9 MG/DL (ref 1.6–2.6)
MCH RBC QN AUTO: 30.4 PG (ref 26.6–33)
MCHC RBC AUTO-ENTMCNC: 31.6 G/DL (ref 31.5–35.7)
MCV RBC AUTO: 96 FL (ref 79–97)
MONOCYTES # BLD AUTO: 0.56 10*3/MM3 (ref 0.1–0.9)
MONOCYTES NFR BLD AUTO: 5.7 % (ref 5–12)
NEUTROPHILS NFR BLD AUTO: 5.81 10*3/MM3 (ref 1.7–7)
NEUTROPHILS NFR BLD AUTO: 59.7 % (ref 42.7–76)
NRBC BLD AUTO-RTO: 0 /100 WBC (ref 0–0.2)
PLATELET # BLD AUTO: 375 10*3/MM3 (ref 140–450)
PMV BLD AUTO: 9.2 FL (ref 6–12)
POTASSIUM SERPL-SCNC: 4.7 MMOL/L (ref 3.5–5.2)
PROT SERPL-MCNC: 7.1 G/DL (ref 6–8.5)
RBC # BLD AUTO: 4.51 10*6/MM3 (ref 3.77–5.28)
SODIUM SERPL-SCNC: 137 MMOL/L (ref 136–145)
T4 FREE SERPL-MCNC: 1.14 NG/DL (ref 0.93–1.7)
TRIGL SERPL-MCNC: 344 MG/DL (ref 0–150)
TSH SERPL DL<=0.05 MIU/L-ACNC: 2.08 UIU/ML (ref 0.27–4.2)
VLDLC SERPL-MCNC: 62 MG/DL (ref 5–40)
WBC # BLD AUTO: 9.75 10*3/MM3 (ref 3.4–10.8)

## 2021-09-07 PROCEDURE — 83735 ASSAY OF MAGNESIUM: CPT | Performed by: NURSE PRACTITIONER

## 2021-09-07 PROCEDURE — 36415 COLL VENOUS BLD VENIPUNCTURE: CPT

## 2021-09-07 PROCEDURE — 80061 LIPID PANEL: CPT | Performed by: NURSE PRACTITIONER

## 2021-09-07 PROCEDURE — 80050 GENERAL HEALTH PANEL: CPT | Performed by: NURSE PRACTITIONER

## 2021-09-07 PROCEDURE — 99214 OFFICE O/P EST MOD 30 MIN: CPT | Performed by: NURSE PRACTITIONER

## 2021-09-07 PROCEDURE — 84481 FREE ASSAY (FT-3): CPT | Performed by: NURSE PRACTITIONER

## 2021-09-07 PROCEDURE — 84439 ASSAY OF FREE THYROXINE: CPT | Performed by: NURSE PRACTITIONER

## 2021-09-07 RX ORDER — LORATADINE 10 MG/1
10 TABLET ORAL DAILY
COMMUNITY
End: 2021-11-09

## 2021-09-07 RX ORDER — ALBUTEROL SULFATE 2.5 MG/3ML
SOLUTION RESPIRATORY (INHALATION)
COMMUNITY
Start: 2021-08-09

## 2021-09-07 RX ORDER — ASPIRIN 81 MG/1
81 TABLET ORAL DAILY
Qty: 90 TABLET | Refills: 3 | Status: SHIPPED | OUTPATIENT
Start: 2021-09-07 | End: 2022-09-07

## 2021-09-07 RX ORDER — NORETHINDRONE ACETATE AND ETHINYL ESTRADIOL 1MG-20(21)
KIT ORAL
COMMUNITY
Start: 2021-08-25 | End: 2022-02-03

## 2021-09-07 NOTE — TELEPHONE ENCOUNTER
----- Message from AVINASH Box sent at 9/7/2021  3:08 PM EDT -----  Please advise patient.  Her LDL is too high, red meat, fried foods and her trig are too high as well, breads/pasta/sodas/carbs.  Forwarded labs to  PCP       Pt was advised of lab results :  TSH   0.270 - 4.200 uIU/mL 2.080      Creatinine   0.57 - 1.00 mg/dL 0.66    Sodium   136 - 145 mmol/L 137    Potassium   3.5 - 5.2 mmol/L 4.7      Total Cholesterol   0 - 200 mg/dL 240High     Triglycerides   0 - 150 mg/dL 344High     HDL Cholesterol   40 - 60 mg/dL 41    LDL Cholesterol    0 - 100 mg/dL 137High       Magnesium   1.6 - 2.6 mg/dL 1.9        SHAKIRA Wright

## 2021-09-07 NOTE — PATIENT INSTRUCTIONS
Palpitations  Palpitations are feelings that your heartbeat is irregular or is faster than normal. It may feel like your heart is fluttering or skipping a beat. Palpitations are usually not a serious problem. They may be caused by many things, including smoking, caffeine, alcohol, stress, and certain medicines or drugs. Most causes of palpitations are not serious. However, some palpitations can be a sign of a serious problem. You may need further tests to rule out serious medical problems.  Follow these instructions at home:         Pay attention to any changes in your condition. Take these actions to help manage your symptoms:  Eating and drinking  · Avoid foods and drinks that may cause palpitations. These may include:  ? Caffeinated coffee, tea, soft drinks, diet pills, and energy drinks.  ? Chocolate.  ? Alcohol.  Lifestyle  · Take steps to reduce your stress and anxiety. Things that can help you relax include:  ? Yoga.  ? Mind-body activities, such as deep breathing, meditation, or using words and images to create positive thoughts (guided imagery).  ? Physical activity, such as swimming, jogging, or walking. Tell your health care provider if your palpitations increase with activity. If you have chest pain or shortness of breath with activity, do not continue the activity until you are seen by your health care provider.  ? Biofeedback. This is a method that helps you learn to use your mind to control things in your body, such as your heartbeat.  · Do not use drugs, including cocaine or ecstasy. Do not use marijuana.  · Get plenty of rest and sleep. Keep a regular bed time.  General instructions  · Take over-the-counter and prescription medicines only as told by your health care provider.  · Do not use any products that contain nicotine or tobacco, such as cigarettes and e-cigarettes. If you need help quitting, ask your health care provider.  · Keep all follow-up visits as told by your health care provider. This  is important. These may include visits for further testing if palpitations do not go away or get worse.  Contact a health care provider if you:  · Continue to have a fast or irregular heartbeat after 24 hours.  · Notice that your palpitations occur more often.  Get help right away if you:  · Have chest pain or shortness of breath.  · Have a severe headache.  · Feel dizzy or you faint.  Summary  · Palpitations are feelings that your heartbeat is irregular or is faster than normal. It may feel like your heart is fluttering or skipping a beat.  · Palpitations may be caused by many things, including smoking, caffeine, alcohol, stress, certain medicines, and drugs.  · Although most causes of palpitations are not serious, some causes can be a sign of a serious medical problem.  · Get help right away if you faint or have chest pain, shortness of breath, a severe headache, or dizziness.  This information is not intended to replace advice given to you by your health care provider. Make sure you discuss any questions you have with your health care provider.  Document Revised: 01/30/2019 Document Reviewed: 01/30/2019  [email or web address] Patient Education © 2021 [email or web address]  Nonspecific Chest Pain  Chest pain can be caused by many different conditions. Some causes of chest pain can be life-threatening. These will require treatment right away. Serious causes of chest pain include:  · Heart attack.  · A tear in the body's main blood vessel.  · Redness and swelling (inflammation) around your heart.  · Blood clot in your lungs.  Other causes of chest pain may not be so serious. These include:  · Heartburn.  · Anxiety or stress.  · Damage to bones or muscles in your chest.  · Lung infections.  Chest pain can feel like:  · Pain or discomfort in your chest.  · Crushing, pressure, aching, or squeezing pain.  · Burning or tingling.  · Dull or sharp pain that is worse when you move, cough, or take a deep breath.  · Pain or discomfort that is  also felt in your back, neck, jaw, shoulder, or arm, or pain that spreads to any of these areas.  It is hard to know whether your pain is caused by something that is serious or something that is not so serious. So it is important to see your doctor right away if you have chest pain.  Follow these instructions at home:  Medicines  · Take over-the-counter and prescription medicines only as told by your doctor.  · If you were prescribed an antibiotic medicine, take it as told by your doctor. Do not stop taking the antibiotic even if you start to feel better.  Lifestyle    · Rest as told by your doctor.  · Do not use any products that contain nicotine or tobacco, such as cigarettes, e-cigarettes, and chewing tobacco. If you need help quitting, ask your doctor.  · Do not drink alcohol.  · Make lifestyle changes as told by your doctor. These may include:  ? Getting regular exercise. Ask your doctor what activities are safe for you.  ? Eating a heart-healthy diet. A diet and nutrition specialist (dietitian) can help you to learn healthy eating options.  ? Staying at a healthy weight.  ? Treating diabetes or high blood pressure, if needed.  ? Lowering your stress. Activities such as yoga and relaxation techniques can help.  General instructions  · Pay attention to any changes in your symptoms. Tell your doctor about them or any new symptoms.  · Avoid any activities that cause chest pain.  · Keep all follow-up visits as told by your doctor. This is important. You may need more testing if your chest pain does not go away.  Contact a doctor if:  · Your chest pain does not go away.  · You feel depressed.  · You have a fever.  Get help right away if:  · Your chest pain is worse.  · You have a cough that gets worse, or you cough up blood.  · You have very bad (severe) pain in your belly (abdomen).  · You pass out (faint).  · You have either of these for no clear reason:  ? Sudden chest discomfort.  ? Sudden discomfort in your  arms, back, neck, or jaw.  · You have shortness of breath at any time.  · You suddenly start to sweat, or your skin gets clammy.  · You feel sick to your stomach (nauseous).  · You throw up (vomit).  · You suddenly feel lightheaded or dizzy.  · You feel very weak or tired.  · Your heart starts to beat fast, or it feels like it is skipping beats.  These symptoms may be an emergency. Do not wait to see if the symptoms will go away. Get medical help right away. Call your local emergency services (911 in the [place] Do not drive yourself to the hospital.  Summary  · Chest pain can be caused by many different conditions. The cause may be serious and need treatment right away. If you have chest pain, see your doctor right away.  · Follow your doctor's instructions for taking medicines and making lifestyle changes.  · Keep all follow-up visits as told by your doctor. This includes visits for any further testing if your chest pain does not go away.  · Be sure to know the signs that show that your condition has become worse. Get help right away if you have these symptoms.  This information is not intended to replace advice given to you by your health care provider. Make sure you discuss any questions you have with your health care provider.  Document Revised: 06/20/2019 Document Reviewed: 06/20/2019  [employer] Patient Education © 2021 [employer]

## 2021-09-07 NOTE — PROGRESS NOTES
Subjective   Melissa Kramer is a 25 y.o. female     Chief Complaint   Patient presents with   • Follow-up   • Chest Pain       HPI    PROBLEM LIST:     1. Palpitations   1.1 event monitor 4/17-5/16/18-normal sinus rhythm, sinus tach  1.2 Event Monitor 11/9-12/8/2019 - NS[facility], ST   2. Shortness of breath   2.1 echo from [facility] 3/2/18 - LVEF 60%, normal diastolic dysfunction, trace MR, borderline pulmonary HTNsystolic pressure estimated 51 mmHg.   2.2 Echo 12/3/18 - EF 56-60%; trace to mild MR, PA 37  3. Chest pain   3.1 Stress Test 12/3/18 - no ischemia; low risk   3.2 Cardiac CTA 11/20/19 - no sig stenosis  4. Dizziness   5.  History of COVID-19 12/2/2020    Patient is a 25-year-old female who presents today for follow-up.  She has been having what she describes as midsternum chest tightness and sometimes it feels like someone sitting on her chest.  She says on occasion it will radiate to her left arm and can occur anytime.  She has it up to 4 times a day.  She says she will get short of breath when it occurs and on one occasion she did get nauseated.  She says her heart will pound when this occurs.  She says outside of that her heart still flutters randomly every day.  She does have dizziness when she overdoes things.  She denies any presyncope, syncope, orthopnea or PND.  She has swelling when her legs hanging down for long periods of time but it goes down overnight.  She says she is short of breath with any activity that she does.  She says also if she does activity that is more than normal she has to use her rescue inhaler.  She has fatigue but she not sure if this is related to her anxiety and depression.  Patient just had knee surgery at the end of August.  She is still currently in a knee brace and unable to walk on a treadmill.  She is waiting to have the left knee done as well.    Current Outpatient Medications on File Prior to Visit   Medication Sig Dispense Refill   • albuterol (PROVENTIL) (2.5 MG/3ML) 0.083%  nebulizer solution      • ALBUTEROL IN 90 mcg. PRN     • Blisovi FE 1/20 1-20 MG-MCG per tablet      • diclofenac sodium (VOTAREN XR) 100 MG 24 hr tablet Take 75 mg by mouth Daily. 75 mg 1 tablet QHS     • fluticasone-salmeterol (ADVAIR) 250-50 MCG/DOSE DISKUS Inhale 2 puffs 2 (Two) Times a Day.     • loratadine (CLARITIN) 10 MG tablet Take 10 mg by mouth Daily.     • sertraline (ZOLOFT) 50 MG tablet Take 50 mg by mouth Daily.     • [DISCONTINUED] Omeprazole 20 MG tablet delayed-release Take 20 mg by mouth Daily.     • metoprolol tartrate (LOPRESSOR) 100 MG tablet Take 1 tab at 7am upon arrival for scan. Do not take second tab, unless told by radiologist 2 tablet 0   • raNITIdine (ZANTAC) 150 MG tablet Take 150 mg by mouth Daily.     • [DISCONTINUED] Norethin-Eth Estrad-Fe Biphas (LO LOESTRIN FE PO) Take  by mouth.       No current facility-administered medications on file prior to visit.       ALLERGIES    Keflex [cephalexin]    Past Medical History:   Diagnosis Date   • Asthma    • COVID-19 12/02/2020   • Mitral valve regurgitation    • Palpitations    • Pulmonary HTN (CMS/HCC)    • Pulmonary hypertension (CMS/HCC) 9/7/2021   • TMJ (dislocation of temporomandibular joint)        Social History     Socioeconomic History   • Marital status:      Spouse name: Not on file   • Number of children: Not on file   • Years of education: Not on file   • Highest education level: Not on file   Tobacco Use   • Smoking status: Never Smoker   • Smokeless tobacco: Never Used   Substance and Sexual Activity   • Alcohol use: No   • Drug use: No   • Sexual activity: Yes     Partners: Male     Birth control/protection: Implant       Family History   Problem Relation Age of Onset   • Clotting disorder Mother         acute DVt    • Cancer Mother    • Heart disease Mother         cardiac arrest   • Other Mother         respiratory failure    • No Known Problems Father    • Heart disease Maternal Grandfather    • Heart attack  Maternal Grandfather        Review of Systems   Constitutional: Positive for fatigue (some days, feels like related to anxiety and depression ). Negative for appetite change, chills, diaphoresis and fever.   HENT: Negative for congestion, rhinorrhea and sore throat.    Eyes: Positive for visual disturbance (corrective lens at night ( driving ) ).   Respiratory: Positive for cough (dry due to asthma ) and shortness of breath (due to asthma, almost every activity; if does more then has to use rescue inhaler and neb; with CP  ). Negative for chest tightness and wheezing.    Cardiovascular: Positive for chest pain (center of the chest bad tightness, someone on chest, will occ go into left arm; can occur at anytime upto 4 x a day ), palpitations (fluttering ( at ki times ); daily ) and leg swelling (legs, feet and ankles due to being on them for long periods of time or sitting with them hanging down , the swelling does go down at night ).   Gastrointestinal: Positive for nausea (x1 with CP ). Negative for abdominal pain, blood in stool, diarrhea and vomiting.   Endocrine: Positive for cold intolerance (Cold at night ) and heat intolerance (Hot flashes and Night Sweats ).   Genitourinary: Negative for difficulty urinating, dysuria, frequency, hematuria and urgency.   Musculoskeletal: Positive for joint swelling (right knee to due SX ). Negative for arthralgias, back pain, neck pain and neck stiffness.        Right knee brace; 8/2021; had knee cap surgery    Skin: Negative for color change, pallor, rash and wound.   Allergic/Immunologic: Negative for environmental allergies and food allergies.   Neurological: Positive for dizziness (asthma when she over does things ), weakness (right leg due to knee sx), numbness (right groin after she had sx if feels like someone is stabbing her with a burning pain it last for like 20 -30 seconds then it goes away , it happens since the right knee sx every night since ) and headaches  "(H/O of headaches ). Negative for light-headedness.   Hematological: Bruises/bleeds easily (Bruises easy ).   Psychiatric/Behavioral: Negative for sleep disturbance.       Objective   /86 (BP Location: Right arm)   Pulse 76   Temp 97.5 °F (36.4 °C)   Ht 165.1 cm (65\")   Wt 104 kg (230 lb)   SpO2 96%   BMI 38.27 kg/m²   Vitals:    09/07/21 1029   BP: 125/86   BP Location: Right arm   Pulse: 76   Temp: 97.5 °F (36.4 °C)   SpO2: 96%   Weight: 104 kg (230 lb)   Height: 165.1 cm (65\")      Lab Results (most recent)     None        Physical Exam  Vitals reviewed.   Constitutional:       General: She is awake.      Appearance: Normal appearance. She is well-developed and well-groomed. She is obese.   HENT:      Head: Normocephalic.   Eyes:      General: Lids are normal.   Neck:      Vascular: No carotid bruit, hepatojugular reflux or JVD.   Cardiovascular:      Rate and Rhythm: Normal rate and regular rhythm.      Pulses:           Radial pulses are 2+ on the right side and 2+ on the left side.        Dorsalis pedis pulses are 2+ on the right side and 2+ on the left side.        Posterior tibial pulses are 2+ on the right side and 2+ on the left side.      Heart sounds: Normal heart sounds.   Pulmonary:      Effort: Pulmonary effort is normal.      Breath sounds: Normal breath sounds and air entry.   Abdominal:      General: Bowel sounds are normal.      Palpations: Abdomen is soft.   Musculoskeletal:      Right lower leg: No edema.      Left lower leg: No edema.      Comments: Right knee brace    Skin:     General: Skin is warm and dry.   Neurological:      Mental Status: She is alert and oriented to person, place, and time.   Psychiatric:         Attention and Perception: Attention and perception normal.         Mood and Affect: Mood and affect normal.         Speech: Speech normal.         Behavior: Behavior normal. Behavior is cooperative.         Thought Content: Thought content normal.         Cognition " and Memory: Cognition and memory normal.         Judgment: Judgment normal.         Procedure   Procedures         Assessment/Plan      Diagnosis Plan   1. Other chest pain  Stress Test With Myocardial Perfusion One Day    Adult Transthoracic Echo Complete W/ Cont if Necessary Per Protocol    aspirin (aspirin) 81 MG EC tablet   2. Palpitations  Stress Test With Myocardial Perfusion One Day    Adult Transthoracic Echo Complete W/ Cont if Necessary Per Protocol    Cardiac Event Monitor   3. Shortness of breath  Stress Test With Myocardial Perfusion One Day    Adult Transthoracic Echo Complete W/ Cont if Necessary Per Protocol   4. Peripheral edema     5. Dizziness  Cardiac Event Monitor   6. Pulmonary hypertension (CMS/HCC)  Adult Transthoracic Echo Complete W/ Cont if Necessary Per Protocol   7. History of COVID-19  Adult Transthoracic Echo Complete W/ Cont if Necessary Per Protocol    aspirin (aspirin) 81 MG EC tablet       Return in about 12 weeks (around 11/30/2021).    Chest pain/palpitation/shortness of breath/pulmonary hypertension/peripheral edema/dizziness/history of COVID-19-patient on ischemia work-up, stress and echo.  She is unable to walk on a treadmill due to recent knee surgery.  She is still in a brace.  He is also waiting to have left knee surgery.  She will start aspirin 81.  She will wear an event monitor for 2 weeks.  Patient is already on the Toprol.  She will continue her medication regimen otherwise.  She will follow-up in 12 weeks or sooner if any changes or abnormalities with testing.         Melissa Kramer  reports that she has never smoked. She has never used smokeless tobacco..Patient brought in medicine list to appointment, it's been reviewed with patient and med list was updated in the chart.             Electronically signed by:

## 2021-09-08 LAB — T3FREE SERPL-MCNC: 3.45 PG/ML (ref 2–4.4)

## 2021-09-09 ENCOUNTER — HOSPITAL ENCOUNTER (OUTPATIENT)
Dept: CARDIOLOGY | Facility: HOSPITAL | Age: 26
Discharge: HOME | End: 2021-09-09

## 2021-09-09 DIAGNOSIS — R06.02 SHORTNESS OF BREATH: ICD-10-CM

## 2021-09-09 DIAGNOSIS — R07.89 OTHER CHEST PAIN: ICD-10-CM

## 2021-09-09 DIAGNOSIS — R00.2 PALPITATIONS: ICD-10-CM

## 2021-09-09 PROCEDURE — 78452 HT MUSCLE IMAGE SPECT MULT: CPT | Performed by: INTERNAL MEDICINE

## 2021-09-09 PROCEDURE — 0 TECHNETIUM SESTAMIBI: Performed by: INTERNAL MEDICINE

## 2021-09-09 PROCEDURE — 25010000002 REGADENOSON 0.4 MG/5ML SOLUTION: Performed by: INTERNAL MEDICINE

## 2021-09-09 PROCEDURE — 93018 CV STRESS TEST I&R ONLY: CPT | Performed by: INTERNAL MEDICINE

## 2021-09-09 PROCEDURE — 93017 CV STRESS TEST TRACING ONLY: CPT

## 2021-09-09 PROCEDURE — 78452 HT MUSCLE IMAGE SPECT MULT: CPT

## 2021-09-09 PROCEDURE — A9500 TC99M SESTAMIBI: HCPCS | Performed by: INTERNAL MEDICINE

## 2021-09-09 RX ADMIN — TECHNETIUM TC 99M SESTAMIBI 1 DOSE: 1 INJECTION INTRAVENOUS at 10:52

## 2021-09-09 RX ADMIN — REGADENOSON 0.4 MG: 0.08 INJECTION, SOLUTION INTRAVENOUS at 11:27

## 2021-09-09 RX ADMIN — TECHNETIUM TC 99M SESTAMIBI 1 DOSE: 1 INJECTION INTRAVENOUS at 11:28

## 2021-09-12 LAB
BH CV REST NUCLEAR ISOTOPE DOSE: 10 MCI
BH CV STRESS COMMENTS STAGE 1: NORMAL
BH CV STRESS DOSE REGADENOSON STAGE 1: 0.4
BH CV STRESS DURATION MIN STAGE 1: 0
BH CV STRESS DURATION SEC STAGE 1: 10
BH CV STRESS NUCLEAR ISOTOPE DOSE: 30 MCI
BH CV STRESS PROTOCOL 1: NORMAL
BH CV STRESS RECOVERY BP: NORMAL MMHG
BH CV STRESS RECOVERY HR: 91 BPM
BH CV STRESS STAGE 1: 1
MAXIMAL PREDICTED HEART RATE: 196 BPM
PERCENT MAX PREDICTED HR: 60.71 %
STRESS BASELINE BP: NORMAL MMHG
STRESS BASELINE HR: 69 BPM
STRESS PERCENT HR: 71 %
STRESS POST PEAK BP: NORMAL MMHG
STRESS POST PEAK HR: 119 BPM
STRESS TARGET HR: 167 BPM

## 2021-09-13 ENCOUNTER — TELEPHONE (OUTPATIENT)
Dept: CARDIOLOGY | Facility: CLINIC | Age: 26
End: 2021-09-13

## 2021-09-13 ENCOUNTER — OFFICE VISIT (OUTPATIENT)
Dept: CARDIOLOGY | Facility: CLINIC | Age: 26
End: 2021-09-13

## 2021-09-13 ENCOUNTER — LAB (OUTPATIENT)
Dept: CARDIOLOGY | Facility: CLINIC | Age: 26
End: 2021-09-13

## 2021-09-13 VITALS
OXYGEN SATURATION: 97 % | DIASTOLIC BLOOD PRESSURE: 84 MMHG | HEART RATE: 86 BPM | TEMPERATURE: 96.9 F | SYSTOLIC BLOOD PRESSURE: 129 MMHG | HEIGHT: 65 IN | BODY MASS INDEX: 38.99 KG/M2 | WEIGHT: 234 LBS

## 2021-09-13 DIAGNOSIS — R94.39 ABNORMAL STRESS TEST: ICD-10-CM

## 2021-09-13 DIAGNOSIS — R00.2 PALPITATIONS: ICD-10-CM

## 2021-09-13 DIAGNOSIS — R79.89 POSITIVE D DIMER: ICD-10-CM

## 2021-09-13 DIAGNOSIS — I27.20 PULMONARY HYPERTENSION (HCC): ICD-10-CM

## 2021-09-13 DIAGNOSIS — R79.89 POSITIVE D DIMER: Primary | ICD-10-CM

## 2021-09-13 DIAGNOSIS — R06.02 SHORTNESS OF BREATH: ICD-10-CM

## 2021-09-13 DIAGNOSIS — Z86.16 HISTORY OF COVID-19: ICD-10-CM

## 2021-09-13 DIAGNOSIS — R07.89 OTHER CHEST PAIN: ICD-10-CM

## 2021-09-13 DIAGNOSIS — R07.89 OTHER CHEST PAIN: Primary | ICD-10-CM

## 2021-09-13 DIAGNOSIS — R60.9 PERIPHERAL EDEMA: ICD-10-CM

## 2021-09-13 DIAGNOSIS — E78.2 HYPERLIPEMIA, MIXED: ICD-10-CM

## 2021-09-13 LAB
ANION GAP SERPL CALCULATED.3IONS-SCNC: 12.1 MMOL/L (ref 5–15)
BUN SERPL-MCNC: 13 MG/DL (ref 6–20)
BUN/CREAT SERPL: 18.6 (ref 7–25)
CALCIUM SPEC-SCNC: 9.9 MG/DL (ref 8.6–10.5)
CHLORIDE SERPL-SCNC: 102 MMOL/L (ref 98–107)
CO2 SERPL-SCNC: 22.9 MMOL/L (ref 22–29)
CREAT SERPL-MCNC: 0.7 MG/DL (ref 0.57–1)
D DIMER PPP FEU-MCNC: 0.67 MCGFEU/ML (ref 0–0.5)
GFR SERPL CREATININE-BSD FRML MDRD: 103 ML/MIN/1.73
GLUCOSE SERPL-MCNC: 92 MG/DL (ref 65–99)
POTASSIUM SERPL-SCNC: 4.9 MMOL/L (ref 3.5–5.2)
SODIUM SERPL-SCNC: 137 MMOL/L (ref 136–145)

## 2021-09-13 PROCEDURE — 80048 BASIC METABOLIC PNL TOTAL CA: CPT | Performed by: NURSE PRACTITIONER

## 2021-09-13 PROCEDURE — 36415 COLL VENOUS BLD VENIPUNCTURE: CPT

## 2021-09-13 PROCEDURE — 85379 FIBRIN DEGRADATION QUANT: CPT | Performed by: NURSE PRACTITIONER

## 2021-09-13 PROCEDURE — 99214 OFFICE O/P EST MOD 30 MIN: CPT | Performed by: NURSE PRACTITIONER

## 2021-09-13 RX ORDER — CLOPIDOGREL BISULFATE 75 MG/1
75 TABLET ORAL DAILY
Qty: 30 TABLET | Refills: 11 | Status: SHIPPED | OUTPATIENT
Start: 2021-09-13 | End: 2021-11-09

## 2021-09-13 RX ORDER — ATORVASTATIN CALCIUM 10 MG/1
10 TABLET, FILM COATED ORAL DAILY
Qty: 30 TABLET | Refills: 11 | Status: SHIPPED | OUTPATIENT
Start: 2021-09-13 | End: 2022-09-07

## 2021-09-13 RX ORDER — NITROGLYCERIN 0.4 MG/1
TABLET SUBLINGUAL
Qty: 30 TABLET | Refills: 5 | Status: SHIPPED | OUTPATIENT
Start: 2021-09-13

## 2021-09-13 RX ORDER — ISOSORBIDE MONONITRATE 30 MG/1
15 TABLET, EXTENDED RELEASE ORAL DAILY
Qty: 30 TABLET | Refills: 11 | Status: SHIPPED | OUTPATIENT
Start: 2021-09-13 | End: 2021-11-09 | Stop reason: ALTCHOICE

## 2021-09-13 NOTE — PROGRESS NOTES
Subjective   Melissa Kramer is a 25 y.o. female     Chief Complaint   Patient presents with   • Follow-up   • Chest Pain       HPI    PROBLEM LIST:     1. Palpitations   1.1 event monitor 4/17-5/16/18-normal sinus rhythm, sinus tach  1.2 Event Monitor 11/9-12/8/2019 - NSR, ST   2. Shortness of breath   2.1 echo from [facility] 3/2/18 - LVEF 60%, normal diastolic dysfunction, trace MR, borderline pulmonary HTNsystolic pressure estimated 51 mmHg.   2.2 Echo 12/3/18 - EF 56-60%; trace to mild MR, PA 37  3. Chest pain   3.1 Stress Test 12/3/18 - no ischemia; low risk   3.2 Cardiac CTA 11/20/19 - no sig stenosis  3.3 stress test 9/9/2021-poor quality images however suggest small, mild anteroapical and inferior apical ischemic defect.  The true apex demonstrates a fixed defect post stress and at rest.  Post-rest EF 60% with inferior basilar hypokinesis.  4. Dizziness   5.  History of COVID-19 12/2/2020    Patient is a 25-year-old female who presents today for follow-up on stress test.  She continues to have what she describes as left anterior sharp pain and a dull ache that has radiated into her left arm one time.  She says this can occur anytime.  She says some days she has it more than others.  She says she will get lightheaded and short of breath whenever this occurs.  Patient also has fluttering but this is not a daily occurrence.  It is separate from her chest discomfort.  She does have dizziness on occasion when she overdoes it but denies any presyncope, syncope, orthopnea or PND.  She gets swelling in her legs that typically resolves overnight.  Patient says she does have shortness of breath walking any distance.  She says sometimes she is fatigued and sometimes she seems to do okay.    We went over stress test.  Patient's last LDL was 137.      Current Outpatient Medications on File Prior to Visit   Medication Sig Dispense Refill   • albuterol (PROVENTIL) (2.5 MG/3ML) 0.083% nebulizer solution      • ALBUTEROL IN 90 mcg.  PRN     • aspirin (aspirin) 81 MG EC tablet Take 1 tablet by mouth Daily. 90 tablet 3   • Blisovi FE 1/20 1-20 MG-MCG per tablet      • diclofenac sodium (VOTAREN XR) 100 MG 24 hr tablet Take 75 mg by mouth Daily. 75 mg 1 tablet QHS     • fluticasone-salmeterol (ADVAIR) 250-50 MCG/DOSE DISKUS Inhale 2 puffs 2 (Two) Times a Day.     • loratadine (CLARITIN) 10 MG tablet Take 10 mg by mouth Daily.     • sertraline (ZOLOFT) 50 MG tablet Take 50 mg by mouth Daily.     • [DISCONTINUED] metoprolol tartrate (LOPRESSOR) 100 MG tablet Take 1 tab at 7am upon arrival for scan. Do not take second tab, unless told by radiologist 2 tablet 0   • [DISCONTINUED] raNITIdine (ZANTAC) 150 MG tablet Take 150 mg by mouth Daily.       No current facility-administered medications on file prior to visit.       ALLERGIES    Keflex [cephalexin]    Past Medical History:   Diagnosis Date   • Asthma    • COVID-19 12/02/2020   • Mitral valve regurgitation    • Palpitations    • Pulmonary HTN (CMS/HCC)    • Pulmonary hypertension (CMS/HCC) 9/7/2021   • TMJ (dislocation of temporomandibular joint)        Social History     Socioeconomic History   • Marital status:      Spouse name: Not on file   • Number of children: Not on file   • Years of education: Not on file   • Highest education level: Not on file   Tobacco Use   • Smoking status: Never Smoker   • Smokeless tobacco: Never Used   Substance and Sexual Activity   • Alcohol use: No   • Drug use: No   • Sexual activity: Yes     Partners: Male     Birth control/protection: Implant       Family History   Problem Relation Age of Onset   • Clotting disorder Mother         acute DVt    • Cancer Mother    • Heart disease Mother         cardiac arrest   • Other Mother         respiratory failure    • No Known Problems Father    • Heart disease Maternal Grandfather    • Heart attack Maternal Grandfather        Review of Systems   Constitutional: Positive for fatigue (some days). Negative for  "appetite change, chills, diaphoresis and fever.   HENT: Negative for congestion, rhinorrhea and sore throat.    Eyes: Positive for visual disturbance (corrective at night ).   Respiratory: Positive for chest tightness (left side of the chest ( tightness ) ) and shortness of breath (wlking at any distance; with CP ). Negative for cough and wheezing.    Cardiovascular: Positive for chest pain ( left side of the chest )  sharp pain and dull ache; can occur at anytime; random, some days more than others; no rad; has gone down left arm 1 x , palpitations (fluttering; occur daily, separate from CP ) and leg swelling (legs, feet and ankles ( swelling is gone by the next am ) ).   Gastrointestinal: Positive for nausea (1 x ). Negative for abdominal pain, blood in stool, constipation, diarrhea and vomiting.   Endocrine: Positive for heat intolerance (Hot flahses and night sweats ). Negative for cold intolerance.   Genitourinary: Positive for frequency (due to drinking alot of water ). Negative for difficulty urinating, dysuria, hematuria and urgency.   Musculoskeletal: Positive for joint swelling (right knee from SX ) and neck pain (only when she has the really bad headaches ). Negative for arthralgias, back pain and neck stiffness.   Skin: Negative for color change, pallor, rash and wound.   Allergic/Immunologic: Negative for environmental allergies and food allergies.   Neurological: Positive for dizziness (only when she over does it ), light-headedness (only when she over does it; with Cp ) and headaches (H/O of Headches). Negative for weakness and numbness.   Hematological: Bruises/bleeds easily (Bruises easy ).   Psychiatric/Behavioral: Positive for sleep disturbance (sleeps good other than getting up several times in the night ). The patient is nervous/anxious.        Objective   /84 (BP Location: Left arm)   Pulse 86   Temp 96.9 °F (36.1 °C)   Ht 165.1 cm (65\")   Wt 106 kg (234 lb)   SpO2 97%   BMI 38.94 " "kg/m²   Vitals:    09/13/21 0926   BP: 129/84   BP Location: Left arm   Pulse: 86   Temp: 96.9 °F (36.1 °C)   SpO2: 97%   Weight: 106 kg (234 lb)   Height: 165.1 cm (65\")      Lab Results (most recent)     None        Physical Exam  Vitals reviewed.   Constitutional:       General: She is awake.      Appearance: Normal appearance. She is well-developed and well-groomed. She is obese.   HENT:      Head: Normocephalic.   Eyes:      General: Lids are normal.   Neck:      Vascular: No carotid bruit, hepatojugular reflux or JVD.   Cardiovascular:      Rate and Rhythm: Normal rate and regular rhythm.      Pulses:           Radial pulses are 2+ on the right side and 2+ on the left side.        Dorsalis pedis pulses are 2+ on the right side and 2+ on the left side.        Posterior tibial pulses are 2+ on the right side and 2+ on the left side.      Heart sounds: Normal heart sounds.   Pulmonary:      Effort: Pulmonary effort is normal.      Breath sounds: Normal breath sounds and air entry.   Abdominal:      General: Bowel sounds are normal.      Palpations: Abdomen is soft.   Musculoskeletal:      Right lower leg: No edema.      Left lower leg: No edema.   Skin:     General: Skin is warm and dry.   Neurological:      Mental Status: She is alert and oriented to person, place, and time.   Psychiatric:         Attention and Perception: Attention and perception normal.         Mood and Affect: Mood and affect normal.         Speech: Speech normal.         Behavior: Behavior normal. Behavior is cooperative.         Thought Content: Thought content normal.         Cognition and Memory: Cognition and memory normal.         Judgment: Judgment normal.         Procedure   Procedures         Assessment/Plan      Diagnosis Plan   1. Other chest pain  [facility]    CBC (No Diff)    Basic Metabolic Panel    clopidogrel (PLAVIX) 75 MG tablet    isosorbide mononitrate (IMDUR) 30 MG 24 hr tablet    nitroglycerin (NITROSTAT) 0.4 " MG SL tablet    D-dimer, Quantitative    Basic Metabolic Panel   2. Shortness of breath  [facility]    D-dimer, Quantitative    Basic Metabolic Panel   3. Pulmonary hypertension (CMS/HCC)     4. Peripheral edema     5. History of COVID-19  [facility]   6. Palpitations  [facility]   7. Abnormal stress test  [facility]    clopidogrel (PLAVIX) 75 MG tablet   8. Hyperlipemia, mixed  [facility]    atorvastatin (LIPITOR) 10 MG tablet       Return 2-4 weeks after [facility].    Chest pain/shortness of breath/history of COVID-19/palpitations/abnormal stress test/hyperlipidemia patient wishes to proceed with left heart cath.  She will start Plavix.  She is going to start isosorbide to half a tab in the evenings.  She will start Lipitor 10 mg by mouth daily for now.  We will increase as needed.  She will use nitro as needed for chest pain no resolution she will go to the ER.  I did advise if the Imdur resolves her symptoms then we can cancel the heart cath.  She will let us know.  She will continue her medication regimen otherwise.  She will get a CBC and BMP prior.  I am going to do a BMP and D-dimer today as well.       Melissa Kramer  reports that she has never smoked. She has never used smokeless tobacco..Patient brought in medicine list to appointment, it's been reviewed with patient and med list was updated in the chart.     Electronically signed by:

## 2021-09-13 NOTE — TELEPHONE ENCOUNTER
D-Dimer, Quantitative   0.00 - 0.50 MCGFEU/mL 0.67High Critical       can you also make sure she is not pregnant....

## 2021-09-13 NOTE — TELEPHONE ENCOUNTER
"I called pt and informed her of results, she stated she \"literally just ate\" and that it was a bag of chips. Pt verbalized understanding not to eat or drink anything else. Stated she will keep her phone w/ her and await call.   "

## 2021-09-13 NOTE — PATIENT INSTRUCTIONS
Nonspecific Chest Pain  Chest pain can be caused by many different conditions. Some causes of chest pain can be life-threatening. These will require treatment right away. Serious causes of chest pain include:  · Heart attack.  · A tear in the body's main blood vessel.  · Redness and swelling (inflammation) around your heart.  · Blood clot in your lungs.  Other causes of chest pain may not be so serious. These include:  · Heartburn.  · Anxiety or stress.  · Damage to bones or muscles in your chest.  · Lung infections.  Chest pain can feel like:  · Pain or discomfort in your chest.  · Crushing, pressure, aching, or squeezing pain.  · Burning or tingling.  · Dull or sharp pain that is worse when you move, cough, or take a deep breath.  · Pain or discomfort that is also felt in your back, neck, jaw, shoulder, or arm, or pain that spreads to any of these areas.  It is hard to know whether your pain is caused by something that is serious or something that is not so serious. So it is important to see your doctor right away if you have chest pain.  Follow these instructions at home:  Medicines  · Take over-the-counter and prescription medicines only as told by your doctor.  · If you were prescribed an antibiotic medicine, take it as told by your doctor. Do not stop taking the antibiotic even if you start to feel better.  Lifestyle    · Rest as told by your doctor.  · Do not use any products that contain nicotine or tobacco, such as cigarettes, e-cigarettes, and chewing tobacco. If you need help quitting, ask your doctor.  · Do not drink alcohol.  · Make lifestyle changes as told by your doctor. These may include:  ? Getting regular exercise. Ask your doctor what activities are safe for you.  ? Eating a heart-healthy diet. A diet and nutrition specialist (dietitian) can help you to learn healthy eating options.  ? Staying at a healthy weight.  ? Treating diabetes or high blood pressure, if needed.  ? Lowering your stress.  Activities such as yoga and relaxation techniques can help.    General instructions  · Pay attention to any changes in your symptoms. Tell your doctor about them or any new symptoms.  · Avoid any activities that cause chest pain.  · Keep all follow-up visits as told by your doctor. This is important. You may need more testing if your chest pain does not go away.  Contact a doctor if:  · Your chest pain does not go away.  · You feel depressed.  · You have a fever.  Get help right away if:  · Your chest pain is worse.  · You have a cough that gets worse, or you cough up blood.  · You have very bad (severe) pain in your belly (abdomen).  · You pass out (faint).  · You have either of these for no clear reason:  ? Sudden chest discomfort.  ? Sudden discomfort in your arms, back, neck, or jaw.  · You have shortness of breath at any time.  · You suddenly start to sweat, or your skin gets clammy.  · You feel sick to your stomach (nauseous).  · You throw up (vomit).  · You suddenly feel lightheaded or dizzy.  · You feel very weak or tired.  · Your heart starts to beat fast, or it feels like it is skipping beats.  These symptoms may be an emergency. Do not wait to see if the symptoms will go away. Get medical help right away. Call your local emergency services (911 in the [place] Do not drive yourself to the hospital.  Summary  · Chest pain can be caused by many different conditions. The cause may be serious and need treatment right away. If you have chest pain, see your doctor right away.  · Follow your doctor's instructions for taking medicines and making lifestyle changes.  · Keep all follow-up visits as told by your doctor. This includes visits for any further testing if your chest pain does not go away.  · Be sure to know the signs that show that your condition has become worse. Get help right away if you have these symptoms.  This information is not intended to replace advice given to you by your health care provider.  Make sure you discuss any questions you have with your health care provider.  Document Revised: 06/20/2019 Document Reviewed: 06/20/2019  [employer] Patient Education © 2021 [employer]    Coronary Angiogram With Stent  Coronary angiogram with stent placement is a procedure to widen or open a narrow blood vessel of the heart (coronary artery). Arteries may become blocked by cholesterol buildup (plaques) in the lining of the artery wall. When a coronary artery becomes partially blocked, blood flow to that area decreases. This may lead to chest pain or a heart attack (myocardial infarction).  A stent is a small piece of metal that looks like mesh or spring. Stent placement may be done as treatment after a heart attack, or to prevent a heart attack if a blocked artery is found by a coronary angiogram.  Let your health care provider know about:  · Any allergies you have, including allergies to medicines or contrast dye.  · All medicines you are taking, including vitamins, herbs, eye drops, creams, and over-the-counter medicines.  · Any problems you or family members have had with anesthetic medicines.  · Any blood disorders you have.  · Any surgeries you have had.  · Any medical conditions you have, including kidney problems or kidney failure.  · Whether you are pregnant or may be pregnant.  · Whether you are breastfeeding.  What are the risks?  Generally, this is a safe procedure. However, serious problems may occur, including:  · Damage to nearby structures or organs, such as the heart, blood vessels, or kidneys.  · A return of blockage.  · Bleeding, infection, or bruising at the insertion site.  · A collection of blood under the skin (hematoma) at the insertion site.  · A blood clot in another part of the body.  · Allergic reaction to medicines or dyes.  · Bleeding into the abdomen (retroperitoneal bleeding).  · Stroke (rare).  · Heart attack (rare).  What happens before the procedure?  Staying hydrated  Follow  instructions from your health care provider about hydration, which may include:  · Up to 2 hours before the procedure - you may continue to drink clear liquids, such as water, clear fruit juice, black coffee, and plain tea.    Eating and drinking restrictions  Follow instructions from your health care provider about eating and drinking, which may include:  · 8 hours before the procedure - stop eating heavy meals or foods, such as meat, fried foods, or fatty foods.  · 6 hours before the procedure - stop eating light meals or foods, such as toast or cereal.  · 2 hours before the procedure - stop drinking clear liquids.  Medicines  Ask your health care provider about:  · Changing or stopping your regular medicines. This is especially important if you are taking diabetes medicines or blood thinners.  · Taking medicines such as aspirin and ibuprofen. These medicines can thin your blood. Do not take these medicines unless your health care provider tells you to take them.  ? Generally, aspirin is recommended before a thin tube, called a catheter, is passed through a blood vessel and inserted into the heart (cardiac catheterization).  · Taking over-the-counter medicines, vitamins, herbs, and supplements.  General instructions  · Do not use any products that contain nicotine or tobacco for at least 4 weeks before the procedure. These products include cigarettes, e-cigarettes, and chewing tobacco. If you need help quitting, ask your health care provider.  · Plan to have someone take you home from the hospital or clinic.  · If you will be going home right after the procedure, plan to have someone with you for 24 hours.  · You may have tests and imaging procedures.  · Ask your health care provider:  ? How your insertion site will be marked. Ask which artery will be used for the procedure.  ? What steps will be taken to help prevent infection. These may include:  § Removing hair at the insertion site.  § Washing skin with a  germ-killing soap.  § Taking antibiotic medicine.  What happens during the procedure?    · An IV will be inserted into one of your veins.  · Electrodes may be placed on your chest to monitor your heart rate during the procedure.  · You will be given one or more of the following:  ? A medicine to help you relax (sedative).  ? A medicine to numb the area (local anesthetic) for catheter insertion.  · A small incision will be made for catheter insertion.  · The catheter will be inserted into an artery using a guide wire. The location may be in your groin, your wrist, or the fold of your arm (near your elbow).  · An X-ray procedure (fluoroscopy) will be used to help guide the catheter to the opening of the heart arteries.  · A dye will be injected into the catheter. X-rays will be taken. The dye helps to show where any narrowing or blockages are located in the arteries.  · Tell your health care provider if you have chest pain or trouble breathing.  · A tiny wire will be guided to the blocked spot, and a balloon will be inflated to make the artery wider.  · The stent will be expanded to crush the plaques into the wall of the vessel. The stent will hold the area open and improve the blood flow. Most stents have a drug coating to reduce the risk of the stent narrowing over time.  · The artery may be made wider using a drill, laser, or other tools that remove plaques.  · The catheter will be removed when the blood flow improves. The stent will stay where it was placed, and the lining of the artery will grow over it.  · A bandage (dressing) will be placed on the insertion site. Pressure will be applied to stop bleeding.  · The IV will be removed.  This procedure may vary among health care providers and hospitals.  What happens after the procedure?  · Your blood pressure, heart rate, breathing rate, and blood oxygen level will be monitored until you leave the hospital or clinic.  · If the procedure is done through the leg, you  will lie flat in bed for a few hours or for as long as told by your health care provider. You will be instructed not to bend or cross your legs.  · The insertion site and the pulse in your foot or wrist will be checked often.  · You may have more blood tests, X-rays, and a test that records the electrical activity of your heart (electrocardiogram, or ECG).  · Do not drive for 24 hours if you were given a sedative during your procedure.  Summary  · Coronary angiogram with stent placement is a procedure to widen or open a narrowed coronary artery. This is done to treat heart problems.  · Before the procedure, let your health care provider know about all the medical conditions and surgeries you have or have had.  · This is a safe procedure. However, some problems may occur, including damage to nearby structures or organs, bleeding, blood clots, or allergies.  · Follow your health care provider's instructions about eating, drinking, medicines, and other lifestyle changes, such as quitting tobacco use before the procedure.  This information is not intended to replace advice given to you by your health care provider. Make sure you discuss any questions you have with your health care provider.  Document Revised: 07/08/2020 Document Reviewed: 07/08/2020  [employer] Patient Education © 2021 [employer]

## 2021-09-13 NOTE — TELEPHONE ENCOUNTER
Stress:  1.  Poor quality scintigraphic images suggest small, mild, anteroapical and inferoapical ischemic defect.  The true apex demonstrates a fixed defect post stress and at rest.     2.  Preserved post-rest ejection fraction of 60% with inferobasilar hypokinesis.     3.  No evidence of pharmacologically induced transient ischemic dilation or of increased lung uptake of radiopharmaceutical.        Called pt, informed her of abn results, she will be here at 9:15am today.

## 2021-09-13 NOTE — TELEPHONE ENCOUNTER
----- Message from AVINASH Box sent at 9/13/2021  3:15 PM EDT -----  Stat CTA chest ordered, please advise patient and schedule thanks

## 2021-09-14 ENCOUNTER — TELEPHONE (OUTPATIENT)
Dept: CARDIOLOGY | Facility: CLINIC | Age: 26
End: 2021-09-14

## 2021-09-14 NOTE — TELEPHONE ENCOUNTER
Akila Cuadra, Yovana Anderson MA  Will you please advise patient CTA negative thanks!       Called and spoke to pt. Advised the above per Akila Cuadra. Pt verbalized understanding.

## 2021-09-15 ENCOUNTER — TELEPHONE (OUTPATIENT)
Dept: CARDIOLOGY | Facility: CLINIC | Age: 26
End: 2021-09-15

## 2021-09-15 NOTE — TELEPHONE ENCOUNTER
Pt LVM on triage line stating that she needs a physician statement for ADA.    Called and spoke to pt. Pt stated she has a form for ADA and wanted to know how she can get the form filled out. Advised pt to send it as a pdf via my chart and I will forward it to Akila Cuadra to see if it is something that she can fill out. Pt verbalized understanding.

## 2021-09-16 ENCOUNTER — TELEPHONE (OUTPATIENT)
Dept: CARDIOLOGY | Facility: CLINIC | Age: 26
End: 2021-09-16

## 2021-09-16 ENCOUNTER — PATIENT MESSAGE (OUTPATIENT)
Dept: CARDIOLOGY | Facility: CLINIC | Age: 26
End: 2021-09-16

## 2021-09-16 NOTE — TELEPHONE ENCOUNTER
Called pt and asked if she has an alt fax # for her work paperwork, as we have been unable to get it to go through:  She stated to fax [phone].         I faxed to the above #.

## 2021-09-17 ENCOUNTER — TELEPHONE (OUTPATIENT)
Dept: CARDIOLOGY | Facility: CLINIC | Age: 26
End: 2021-09-17

## 2021-09-17 NOTE — TELEPHONE ENCOUNTER
Advised that we sent the fax 2 times yesterday and received both times an ok that it went through. Advised that Tyra is sending again now. Pt verbalized understanding.

## 2021-10-04 ENCOUNTER — TELEPHONE (OUTPATIENT)
Dept: CARDIOLOGY | Facility: CLINIC | Age: 26
End: 2021-10-04

## 2021-10-04 NOTE — TELEPHONE ENCOUNTER
----- Message from AVINASH Box sent at 10/4/2021  9:15 AM EDT -----  Please advise patient, for [facility]         Left mess for pt regarding lab results - ( WNL)      SHAKIRA Wright

## 2021-10-12 ENCOUNTER — HOSPITAL ENCOUNTER (OUTPATIENT)
Dept: CARDIOLOGY | Facility: HOSPITAL | Age: 26
Discharge: HOME | End: 2021-10-12
Admitting: NURSE PRACTITIONER

## 2021-10-12 DIAGNOSIS — R00.2 PALPITATIONS: ICD-10-CM

## 2021-10-12 DIAGNOSIS — Z86.16 HISTORY OF COVID-19: ICD-10-CM

## 2021-10-12 DIAGNOSIS — R07.89 OTHER CHEST PAIN: ICD-10-CM

## 2021-10-12 DIAGNOSIS — I27.20 PULMONARY HYPERTENSION (HCC): ICD-10-CM

## 2021-10-12 DIAGNOSIS — R06.02 SHORTNESS OF BREATH: ICD-10-CM

## 2021-10-12 PROCEDURE — 93306 TTE W/DOPPLER COMPLETE: CPT | Performed by: INTERNAL MEDICINE

## 2021-10-12 PROCEDURE — 93306 TTE W/DOPPLER COMPLETE: CPT

## 2021-10-18 ENCOUNTER — TELEPHONE (OUTPATIENT)
Dept: CARDIOLOGY | Facility: CLINIC | Age: 26
End: 2021-10-18

## 2021-10-20 ENCOUNTER — TELEPHONE (OUTPATIENT)
Dept: CARDIOLOGY | Facility: CLINIC | Age: 26
End: 2021-10-20

## 2021-10-20 LAB
BH CV ECHO MEAS - ACS: 2.2 CM
BH CV ECHO MEAS - AO MAX PG: 7.1 MMHG
BH CV ECHO MEAS - AO MEAN PG: 4 MMHG
BH CV ECHO MEAS - AO ROOT AREA (BSA CORRECTED): 1.5
BH CV ECHO MEAS - AO ROOT AREA: 7.5 CM^2
BH CV ECHO MEAS - AO ROOT DIAM: 3.1 CM
BH CV ECHO MEAS - AO V2 MAX: 133 CM/SEC
BH CV ECHO MEAS - AO V2 MEAN: 98.4 CM/SEC
BH CV ECHO MEAS - AO V2 VTI: 27.1 CM
BH CV ECHO MEAS - BSA(HAYCOCK): 2.3 M^2
BH CV ECHO MEAS - BSA: 2.1 M^2
BH CV ECHO MEAS - BZI_BMI: 38.9 KILOGRAMS/M^2
BH CV ECHO MEAS - BZI_METRIC_HEIGHT: 165.1 CM
BH CV ECHO MEAS - BZI_METRIC_WEIGHT: 106.1 KG
BH CV ECHO MEAS - EDV(CUBED): 65.9 ML
BH CV ECHO MEAS - EDV(MOD-SP4): 89 ML
BH CV ECHO MEAS - EDV(TEICH): 71.7 ML
BH CV ECHO MEAS - EF(CUBED): 67.1 %
BH CV ECHO MEAS - EF(MOD-SP4): 63.5 %
BH CV ECHO MEAS - EF(TEICH): 59.1 %
BH CV ECHO MEAS - EF_3D-VOL: 47 %
BH CV ECHO MEAS - ESV(CUBED): 21.7 ML
BH CV ECHO MEAS - ESV(MOD-SP4): 32.5 ML
BH CV ECHO MEAS - ESV(TEICH): 29.3 ML
BH CV ECHO MEAS - FS: 30.9 %
BH CV ECHO MEAS - IVS/LVPW: 0.87
BH CV ECHO MEAS - IVSD: 1.1 CM
BH CV ECHO MEAS - LA DIMENSION: 4 CM
BH CV ECHO MEAS - LA/AO: 1.3
BH CV ECHO MEAS - LV DIASTOLIC VOL/BSA (35-75): 42.1 ML/M^2
BH CV ECHO MEAS - LV IVRT: 0.11 SEC
BH CV ECHO MEAS - LV MASS(C)D: 173.2 GRAMS
BH CV ECHO MEAS - LV MASS(C)DI: 81.9 GRAMS/M^2
BH CV ECHO MEAS - LV SYSTOLIC VOL/BSA (12-30): 15.4 ML/M^2
BH CV ECHO MEAS - LVIDD: 4 CM
BH CV ECHO MEAS - LVIDS: 2.8 CM
BH CV ECHO MEAS - LVLD AP4: 7.5 CM
BH CV ECHO MEAS - LVLS AP4: 7.3 CM
BH CV ECHO MEAS - LVOT AREA (M): 3.5 CM^2
BH CV ECHO MEAS - LVOT AREA: 3.5 CM^2
BH CV ECHO MEAS - LVOT DIAM: 2.1 CM
BH CV ECHO MEAS - LVPWD: 1.3 CM
BH CV ECHO MEAS - MV A MAX VEL: 52.7 CM/SEC
BH CV ECHO MEAS - MV DEC SLOPE: 230 CM/SEC^2
BH CV ECHO MEAS - MV E MAX VEL: 74.6 CM/SEC
BH CV ECHO MEAS - MV E/A: 1.4
BH CV ECHO MEAS - RVDD: 3.6 CM
BH CV ECHO MEAS - SI(AO): 96.7 ML/M^2
BH CV ECHO MEAS - SI(CUBED): 20.9 ML/M^2
BH CV ECHO MEAS - SI(MOD-SP4): 26.7 ML/M^2
BH CV ECHO MEAS - SI(TEICH): 20 ML/M^2
BH CV ECHO MEAS - SV(AO): 204.5 ML
BH CV ECHO MEAS - SV(CUBED): 44.2 ML
BH CV ECHO MEAS - SV(MOD-SP4): 56.5 ML
BH CV ECHO MEAS - SV(TEICH): 42.4 ML
MAXIMAL PREDICTED HEART RATE: 195 BPM
STRESS TARGET HR: 166 BPM

## 2021-10-21 ENCOUNTER — OUTSIDE FACILITY SERVICE (OUTPATIENT)
Dept: CARDIOLOGY | Facility: CLINIC | Age: 26
End: 2021-10-21

## 2021-10-21 PROCEDURE — 93458 L HRT ARTERY/VENTRICLE ANGIO: CPT | Performed by: INTERNAL MEDICINE

## 2021-10-25 ENCOUNTER — TELEPHONE (OUTPATIENT)
Dept: CARDIOLOGY | Facility: CLINIC | Age: 26
End: 2021-10-25

## 2021-10-25 NOTE — TELEPHONE ENCOUNTER
Pt called had a heart cath on 10/21/21 was told to be off work the day after, Was needing a work excuse. Is this okay?

## 2021-11-02 ENCOUNTER — TELEPHONE (OUTPATIENT)
Dept: CARDIOLOGY | Facility: CLINIC | Age: 26
End: 2021-11-02

## 2021-11-02 NOTE — TELEPHONE ENCOUNTER
----- Message from Melissa Kramer sent at 11/2/2021  1:31 PM EDT -----  Regarding: ADA forms  Step One is my form so that you can see what I put step 2 is what I need you to fill out and you are able to to put more info then the last time you filed this form out for me. I'm still trying to get approved for work.

## 2021-11-09 ENCOUNTER — OFFICE VISIT (OUTPATIENT)
Dept: CARDIOLOGY | Facility: CLINIC | Age: 26
End: 2021-11-09

## 2021-11-09 VITALS
HEIGHT: 65 IN | BODY MASS INDEX: 38.99 KG/M2 | OXYGEN SATURATION: 96 % | DIASTOLIC BLOOD PRESSURE: 84 MMHG | WEIGHT: 234 LBS | HEART RATE: 70 BPM | SYSTOLIC BLOOD PRESSURE: 127 MMHG | TEMPERATURE: 97.9 F

## 2021-11-09 DIAGNOSIS — I27.20 PULMONARY HYPERTENSION (HCC): ICD-10-CM

## 2021-11-09 DIAGNOSIS — R06.02 SHORTNESS OF BREATH: ICD-10-CM

## 2021-11-09 DIAGNOSIS — R07.89 OTHER CHEST PAIN: Primary | ICD-10-CM

## 2021-11-09 DIAGNOSIS — E78.2 MIXED HYPERLIPIDEMIA: ICD-10-CM

## 2021-11-09 DIAGNOSIS — Z86.16 HISTORY OF COVID-19: ICD-10-CM

## 2021-11-09 DIAGNOSIS — R60.9 PERIPHERAL EDEMA: ICD-10-CM

## 2021-11-09 DIAGNOSIS — R00.2 PALPITATIONS: ICD-10-CM

## 2021-11-09 PROCEDURE — 99214 OFFICE O/P EST MOD 30 MIN: CPT | Performed by: NURSE PRACTITIONER

## 2021-11-09 RX ORDER — ATENOLOL 25 MG/1
12.5 TABLET ORAL DAILY
Qty: 90 TABLET | Refills: 3 | Status: SHIPPED | OUTPATIENT
Start: 2021-11-09 | End: 2022-02-03 | Stop reason: ALTCHOICE

## 2021-11-09 NOTE — PROGRESS NOTES
Subjective   Melissa Kramer is a 26 y.o. female     Chief Complaint   Patient presents with   • Follow-up   • Chest Pain       HPI    PROBLEM LIST:     1. Palpitations   1.1 event monitor 4/17-5/16/18-normal sinus rhythm, sinus tach  1.2 Event Monitor 9/7-9/20/2021-sinus rhythm, sinus tach  2. Shortness of breath   2.1 echo from [facility] 3/2/18 - LVEF 60%, normal diastolic dysfunction, trace MR, borderline pulmonary HTNsystolic pressure estimated 51 mmHg.   2.2 Echo 12/3/18 - EF 56-60%; trace to mild MR, PA 37  2.3 echo 10/12/2021-EF 55 to 60%, trivial TR  3. Chest pain   3.1 Stress Test 12/3/18 - no ischemia; low risk   3.2 Cardiac CTA 11/20/19 - no sig stenosis  3.3 stress test 9/9/2021-poor quality images however suggest small, mild anteroapical and inferior apical ischemic defect.  The true apex demonstrates a fixed defect post stress and at rest.  Post-rest EF 60% with inferior basilar hypokinesis.  3.4 C 10/21/2021 -normal coronary arteries, EF 60 to 65%, LVEDP 14-16  4. Dizziness   5.  History of COVID-19 12/2/2020    Patient is a 26-year-old female who presents today for follow-up status post left heart cath.  She does continue to have what she describes as sharp pains in the center of her chest.  She says she will take the nitroglycerin up to 3 and if it does not resolve she goes to the ER.  She says that she will have palpitations sometimes with the chest pain and sometimes it is separate.  She says her palpitations feel like a butterfly in her chest.  She denies any dizziness, presyncope, syncope, orthopnea or PND.  She says she has some lightheadedness when she puts her head to her chest.  She does get swelling in her legs but when she puts them up or goes to bed they go down.  She does have shortness of breath but typically only if she has an asthma flareup.  Patient says she did have upper scope about 3 years ago and the physician told her she looked like she was getting the start of an ulcer.  I advised  that could be the cause of her symptoms, H. pylori or even possibly a hernia.  I did recommend that she follow-up with PCP for other causes of the chest pain she is having.    We went over left heart cath, echo and event.    Current Outpatient Medications on File Prior to Visit   Medication Sig Dispense Refill   • albuterol (PROVENTIL) (2.5 MG/3ML) 0.083% nebulizer solution      • ALBUTEROL IN 90 mcg. PRN     • aspirin (aspirin) 81 MG EC tablet Take 1 tablet by mouth Daily. 90 tablet 3   • atorvastatin (LIPITOR) 10 MG tablet Take 1 tablet by mouth Daily. 30 tablet 11   • Blisovi FE 1/20 1-20 MG-MCG per tablet      • fluticasone-salmeterol (ADVAIR) 250-50 MCG/DOSE DISKUS Inhale 2 puffs 2 (Two) Times a Day.     • nitroglycerin (NITROSTAT) 0.4 MG SL tablet 1 under the tongue as needed for angina, may repeat q5mins for up three doses 30 tablet 5   • sertraline (ZOLOFT) 50 MG tablet Take 50 mg by mouth Daily.     • [DISCONTINUED] diclofenac sodium (VOTAREN XR) 100 MG 24 hr tablet Take 75 mg by mouth Daily. 75 mg 1 tablet QHS     • [DISCONTINUED] isosorbide mononitrate (IMDUR) 30 MG 24 hr tablet Take 0.5 tablets by mouth Daily. 30 tablet 11   • [DISCONTINUED] clopidogrel (PLAVIX) 75 MG tablet Take 1 tablet by mouth Daily. 30 tablet 11   • [DISCONTINUED] loratadine (CLARITIN) 10 MG tablet Take 10 mg by mouth Daily.       No current facility-administered medications on file prior to visit.       ALLERGIES    Keflex [cephalexin]    Past Medical History:   Diagnosis Date   • Asthma    • COVID-19 12/02/2020   • Mitral valve regurgitation    • Mixed hyperlipidemia 11/9/2021   • Palpitations    • Pulmonary HTN (HCC)    • Pulmonary hypertension (HCC) 9/7/2021   • TMJ (dislocation of temporomandibular joint)        Social History     Socioeconomic History   • Marital status:    Tobacco Use   • Smoking status: Never Smoker   • Smokeless tobacco: Never Used   Substance and Sexual Activity   • Alcohol use: No   • Drug use: No    • Sexual activity: Yes     Partners: Male     Birth control/protection: Implant       Family History   Problem Relation Age of Onset   • Clotting disorder Mother         acute DVt    • Cancer Mother    • Heart disease Mother         cardiac arrest   • Other Mother         respiratory failure    • No Known Problems Father    • Heart disease Maternal Grandfather    • Heart attack Maternal Grandfather        Review of Systems   Constitutional: Negative for appetite change, chills, fatigue and fever.   HENT: Negative for congestion, rhinorrhea and sore throat.    Eyes: Positive for visual disturbance (corrective lens at night ).   Respiratory: Positive for chest tightness (center of the chest ( pressure ) stabbing pain goes along with it that will come and go ) and shortness of breath (asthma; if flare up). Negative for cough and wheezing.    Cardiovascular: Positive for chest pain (center of the chest ( stabbing pain ) she will take a Nitro when its really bad or she goes to the ER ), palpitations (butterfly in her chest; sometimes with CP and sometimes separate ) and leg swelling (legs, feet and ankles swelling goes down once she put them up and goes to bed ).   Gastrointestinal: Negative for abdominal pain, blood in stool, constipation, diarrhea, nausea and vomiting.   Endocrine: Positive for heat intolerance (Night Sweats and Hot Flahses ). Negative for cold intolerance.   Genitourinary: Negative for difficulty urinating, dysuria, frequency, hematuria and urgency.   Musculoskeletal: Positive for back pain (upper and lower ) and neck pain (base of the back of the head ). Negative for arthralgias, joint swelling and neck stiffness.   Skin: Negative for color change, pallor, rash and wound.   Allergic/Immunologic: Negative for environmental allergies and food allergies.   Neurological: Positive for light-headedness (when she put her head down to her chest ) and headaches (H/O H/A ). Negative for dizziness, weakness  "and numbness.   Hematological: Bruises/bleeds easily (Brusies and bleed due to meds ).   Psychiatric/Behavioral: Negative for sleep disturbance.       Objective   /84 (BP Location: Right arm)   Pulse 70   Temp 97.9 °F (36.6 °C)   Ht 165.1 cm (65\")   Wt 106 kg (234 lb)   SpO2 96%   BMI 38.94 kg/m²   Vitals:    11/09/21 1031   BP: 127/84   BP Location: Right arm   Pulse: 70   Temp: 97.9 °F (36.6 °C)   SpO2: 96%   Weight: 106 kg (234 lb)   Height: 165.1 cm (65\")      Lab Results (most recent)     None        Physical Exam  Vitals reviewed.   Constitutional:       General: She is awake.      Appearance: Normal appearance. She is well-developed and well-groomed. She is obese.   HENT:      Head: Normocephalic.   Eyes:      General: Lids are normal.   Neck:      Vascular: No carotid bruit, hepatojugular reflux or JVD.   Cardiovascular:      Rate and Rhythm: Normal rate and regular rhythm.      Pulses:           Radial pulses are 2+ on the right side and 2+ on the left side.        Dorsalis pedis pulses are 2+ on the right side and 2+ on the left side.        Posterior tibial pulses are 2+ on the right side and 2+ on the left side.      Heart sounds: Normal heart sounds.   Pulmonary:      Effort: Pulmonary effort is normal.      Breath sounds: Normal breath sounds and air entry.   Abdominal:      General: Bowel sounds are normal.      Palpations: Abdomen is soft.   Musculoskeletal:      Right lower leg: No edema.      Left lower leg: No edema.   Skin:     General: Skin is warm and dry.   Neurological:      Mental Status: She is alert and oriented to person, place, and time.   Psychiatric:         Attention and Perception: Attention and perception normal.         Mood and Affect: Mood and affect normal.         Speech: Speech normal.         Behavior: Behavior normal. Behavior is cooperative.         Thought Content: Thought content normal.         Cognition and Memory: Cognition and memory normal.         " Judgment: Judgment normal.         Procedure   Procedures         Assessment/Plan      Diagnosis Plan   1. Other chest pain  atenolol (TENORMIN) 25 MG tablet   2. Palpitations  atenolol (TENORMIN) 25 MG tablet   3. Shortness of breath     4. Pulmonary hypertension (HCC)     5. Peripheral edema     6. History of COVID-19     7. Mixed hyperlipidemia         Return in about 12 weeks (around 2/1/2022).    Chest pain/palpitations-we will start atenolol half a tab and discontinue the isosorbide.  Shortness of breath/pulmonary hypertension-patient sees pulmonary.  Peripheral edema-resolved with elevation.  History of COVID-19-stable EF.  Hyperlipidemia-patient's on Lipitor and doing well.  She will continue her medication regimen with above-noted changes.  She will follow-up in 12 weeks or sooner if any changes.    If patient continues to have any symptoms she can contact us for nurse visit versus having to go to the ER if it is cardiac in nature.       Melissa Kramer  reports that she has never smoked. She has never used smokeless tobacco.. Patient undated her medication list on [employer] [facility]     Electronically signed by:

## 2022-02-03 ENCOUNTER — OFFICE VISIT (OUTPATIENT)
Dept: CARDIOLOGY | Facility: CLINIC | Age: 27
End: 2022-02-03

## 2022-02-03 VITALS
BODY MASS INDEX: 38.65 KG/M2 | TEMPERATURE: 97.1 F | HEIGHT: 65 IN | WEIGHT: 232 LBS | DIASTOLIC BLOOD PRESSURE: 84 MMHG | HEART RATE: 69 BPM | SYSTOLIC BLOOD PRESSURE: 119 MMHG | OXYGEN SATURATION: 98 %

## 2022-02-03 DIAGNOSIS — I27.20 PULMONARY HYPERTENSION: ICD-10-CM

## 2022-02-03 DIAGNOSIS — R60.9 PERIPHERAL EDEMA: ICD-10-CM

## 2022-02-03 DIAGNOSIS — E78.2 MIXED HYPERLIPIDEMIA: ICD-10-CM

## 2022-02-03 DIAGNOSIS — R00.2 PALPITATIONS: ICD-10-CM

## 2022-02-03 DIAGNOSIS — R07.89 OTHER CHEST PAIN: Primary | ICD-10-CM

## 2022-02-03 DIAGNOSIS — R06.02 SHORTNESS OF BREATH: ICD-10-CM

## 2022-02-03 DIAGNOSIS — Z86.16 HISTORY OF COVID-19: ICD-10-CM

## 2022-02-03 PROCEDURE — 99214 OFFICE O/P EST MOD 30 MIN: CPT | Performed by: NURSE PRACTITIONER

## 2022-02-03 RX ORDER — ISOSORBIDE MONONITRATE 30 MG/1
30 TABLET, EXTENDED RELEASE ORAL DAILY PRN
COMMUNITY
End: 2022-09-07

## 2022-02-03 RX ORDER — RIMEGEPANT SULFATE 75 MG/75MG
75 TABLET, ORALLY DISINTEGRATING ORAL DAILY PRN
COMMUNITY
End: 2022-09-07

## 2022-02-03 RX ORDER — LABETALOL 100 MG/1
100 TABLET, FILM COATED ORAL 2 TIMES DAILY
Qty: 90 TABLET | Refills: 3 | Status: SHIPPED | OUTPATIENT
Start: 2022-02-03 | End: 2022-11-09

## 2022-02-03 NOTE — PROGRESS NOTES
Subjective   Melissa Kramer is a 26 y.o. female     Chief Complaint   Patient presents with   • Follow-up   • Chest Pain       HPI    PROBLEM LIST:     1. Palpitations   1.1 event monitor 4/17-5/16/18-normal sinus rhythm, sinus tach  1.2 Event Monitor 9/7-9/20/2021-sinus rhythm, sinus tach  2. Shortness of breath   2.1 echo from [facility] 3/2/18 - LVEF 60%, normal diastolic dysfunction, trace MR, borderline pulmonary HTNsystolic pressure estimated 51 mmHg.   2.2 Echo 12/3/18 - EF 56-60%; trace to mild MR, PA 37  2.3 echo 10/12/2021-EF 55 to 60%, trivial TR  3. Chest pain   3.1 Stress Test 12/3/18 - no ischemia; low risk   3.2 Cardiac CTA 11/20/19 - no sig stenosis  3.3 stress test 9/9/2021-poor quality images however suggest small, mild anteroapical and inferior apical ischemic defect.  The true apex demonstrates a fixed defect post stress and at rest.  Post-rest EF 60% with inferior basilar hypokinesis.  3.4 C 10/21/2021 -normal coronary arteries, EF 60 to 65%, LVEDP 14-16  4. Dizziness   5.  History of COVID-19 12/2/2020    Patient is a 26-year-old female who presents today for follow-up.  She does still have chest pain on occasion that she describes as a dull ache in the center of the chest.  She will take nitroglycerin and says that it will resolve.  She says it last for 30 seconds or less.  Patient says that she does continue to have palpitations but they have improved since starting the atenolol.  She denies any dizziness, presyncope, syncope, orthopnea or PND.  She does get swelling in her legs but when she elevates them it goes down.  Patient has shortness of breath that she relates to asthma.  She says she does like she is actually had a decrease in appetite since trying a new medication.  Patient is going to try to get pregnant somewhat and switch her atenolol to labetalol.  We will start with 100 mg once a day and she will see how she does with this and we can increase accordingly.  She does feel better taking  medication only once a day so that she can remember.    Current Outpatient Medications on File Prior to Visit   Medication Sig Dispense Refill   • albuterol (PROVENTIL) (2.5 MG/3ML) 0.083% nebulizer solution      • ALBUTEROL IN 90 mcg. PRN     • aspirin (aspirin) 81 MG EC tablet Take 1 tablet by mouth Daily. 90 tablet 3   • atorvastatin (LIPITOR) 10 MG tablet Take 1 tablet by mouth Daily. 30 tablet 11   • fluticasone-salmeterol (ADVAIR) 250-50 MCG/DOSE DISKUS Inhale 2 puffs 2 (Two) Times a Day.     • isosorbide mononitrate (IMDUR) 30 MG 24 hr tablet Take 30 mg by mouth Daily As Needed.     • nitroglycerin (NITROSTAT) 0.4 MG SL tablet 1 under the tongue as needed for angina, may repeat q5mins for up three doses 30 tablet 5   • Rimegepant Sulfate (Nurtec) 75 MG tablet dispersible tablet Take 75 mg by mouth Daily As Needed.     • sertraline (ZOLOFT) 50 MG tablet Take 50 mg by mouth Daily.     • TOPIRAMATE PO Take 25 mg by mouth 2 (Two) Times a Day.     • [DISCONTINUED] atenolol (TENORMIN) 25 MG tablet Take 0.5 tablets by mouth Daily. 90 tablet 3   • [DISCONTINUED] Blisovi FE 1/20 1-20 MG-MCG per tablet        No current facility-administered medications on file prior to visit.       ALLERGIES    Keflex [cephalexin]    Past Medical History:   Diagnosis Date   • Asthma    • COVID-19 12/02/2020   • COVID-19 vaccine administered 1st- 11/17/2017 12/08/2021 - dakotafizer    • Mitral valve regurgitation    • Mixed hyperlipidemia 11/9/2021   • Palpitations    • Pulmonary HTN (HCC)    • Pulmonary hypertension (HCC) 9/7/2021   • TMJ (dislocation of temporomandibular joint)        Social History     Socioeconomic History   • Marital status:    Tobacco Use   • Smoking status: Never Smoker   • Smokeless tobacco: Never Used   Substance and Sexual Activity   • Alcohol use: No   • Drug use: No   • Sexual activity: Yes     Partners: Male     Birth control/protection: Implant       Family History   Problem Relation Age of Onset    • Clotting disorder Mother         acute DVt    • Cancer Mother    • Heart disease Mother         cardiac arrest   • Other Mother         respiratory failure    • No Known Problems Father    • Heart disease Maternal Grandfather    • Heart attack Maternal Grandfather        Review of Systems   Constitutional: Positive for appetite change (decreased appetite due to trying out a new medication ). Negative for chills, fatigue and fever.   HENT: Negative for congestion, rhinorrhea and sore throat.    Eyes: Positive for visual disturbance (contacts lens ).   Respiratory: Positive for shortness of breath (due to asthma ). Negative for cough, chest tightness and wheezing.    Cardiovascular: Positive for chest pain (center of the chest it comes and goes ( dull ache ) she has had to take a Nitro it has lasted for around 30 seconds or less ), palpitations (fluttering , heart races and pounds always; better since starting atenolol ) and leg swelling (legs, feet and ankles swelling goes down once she puts her feet up and gone by the next am ).   Gastrointestinal: Negative for abdominal pain, blood in stool, constipation, diarrhea, nausea and vomiting.   Endocrine: Positive for heat intolerance (Night sweats and Hot flashes ). Negative for cold intolerance.   Genitourinary: Positive for frequency (several times in the day and night she drinks over a gallon of water ). Negative for difficulty urinating, dysuria, hematuria and urgency.   Musculoskeletal: Negative for arthralgias, back pain, joint swelling, neck pain and neck stiffness.   Skin: Negative for color change, pallor, rash and wound.   Allergic/Immunologic: Negative for environmental allergies and food allergies.   Neurological: Positive for numbness (arms ( tingles and numbness ) ) and headaches (H/O of Tension and Migrains ). Negative for dizziness, weakness and light-headedness.   Hematological: Bruises/bleeds easily (Bruises due to ASA ).   Psychiatric/Behavioral:  "Negative for sleep disturbance.       Objective   /84 (BP Location: Left arm, Patient Position: Sitting)   Pulse 69   Temp 97.1 °F (36.2 °C)   Ht 165.1 cm (65\")   Wt 105 kg (232 lb)   SpO2 98%   BMI 38.61 kg/m²   Vitals:    02/03/22 1402   BP: 119/84   BP Location: Left arm   Patient Position: Sitting   Pulse: 69   Temp: 97.1 °F (36.2 °C)   SpO2: 98%   Weight: 105 kg (232 lb)   Height: 165.1 cm (65\")      Lab Results (most recent)     None        Physical Exam  Vitals reviewed.   Constitutional:       General: She is awake.      Appearance: Normal appearance. She is well-developed and well-groomed. She is obese.   HENT:      Head: Normocephalic.   Eyes:      General: Lids are normal.   Neck:      Vascular: No carotid bruit, hepatojugular reflux or JVD.   Cardiovascular:      Rate and Rhythm: Normal rate and regular rhythm.      Pulses:           Radial pulses are 2+ on the right side and 2+ on the left side.        Dorsalis pedis pulses are 2+ on the right side and 2+ on the left side.        Posterior tibial pulses are 2+ on the right side and 2+ on the left side.      Heart sounds: Normal heart sounds.   Pulmonary:      Effort: Pulmonary effort is normal.      Breath sounds: Normal breath sounds and air entry.   Abdominal:      General: Bowel sounds are normal.      Palpations: Abdomen is soft.   Musculoskeletal:      Right lower leg: No edema.      Left lower leg: No edema.   Skin:     General: Skin is warm and dry.   Neurological:      Mental Status: She is alert and oriented to person, place, and time.   Psychiatric:         Attention and Perception: Attention and perception normal.         Mood and Affect: Mood and affect normal.         Speech: Speech normal.         Behavior: Behavior normal. Behavior is cooperative.         Thought Content: Thought content normal.         Cognition and Memory: Cognition and memory normal.         Judgment: Judgment normal.         Procedure   Procedures   "       Assessment/Plan      Diagnosis Plan   1. Other chest pain     2. Palpitations  labetalol (NORMODYNE) 100 MG tablet   3. Mixed hyperlipidemia     4. Shortness of breath     5. Pulmonary hypertension (HCC)     6. Peripheral edema     7. History of COVID-19         Return in about 12 weeks (around 4/28/2022).    Chest pain-patient had normal coronary arteries and heart cath in October of last year.  May not be cardiac in nature however she does use nitro as needed.  Palpitations-switch atenolol to labetalol as patient's going to try to get pregnant.  Hyperlipidemia-patient's on Lipitor.  Shortness of breath-stable.  Pulmonary hypertension-stable.  Peripheral edema-stable and resolves with elevation.  History of COVID-19-stable EF.  Patient will continue her medication regimen above-noted change.  Show up in 12 weeks or sooner if any changes.       Melissa Kramer  reports that she has never smoked. She has never used smokeless tobacco.. Patient brought in medicine list to appointment, it's been reviewed with patient and med list was updated in the chart.     Electronically signed by:

## 2022-04-06 ENCOUNTER — TELEPHONE (OUTPATIENT)
Dept: CARDIOLOGY | Facility: CLINIC | Age: 27
End: 2022-04-06

## 2022-04-06 NOTE — TELEPHONE ENCOUNTER
Received cardiac clearance request from  stating pt has left knee scope with lateral release to be scheduled and is requiring a cardiac clearance. Placed cardiac clearance request in Akila's inbox to review and address with provider.

## 2022-05-23 ENCOUNTER — TELEPHONE (OUTPATIENT)
Dept: CARDIOLOGY | Facility: CLINIC | Age: 27
End: 2022-05-23

## 2022-05-23 NOTE — TELEPHONE ENCOUNTER
Akila gave me FMLA paperwork to fax for pt, per Mariana, I have to collect the $25 FMLA payment prior to faxing it in.      $25 payment is requested prior to any FMLA paperwork being completed as it's a new policy initiated for 2022.           Called pt, I asked if she dropped off the paperwork or had it faxed, she stated she had it faxed to us. I let her know about the $25 payment for completion of FMLA paperwork and apologized for any inconvenience. She verbalized understanding and stated she could come pay and P/U later this week.       Placed a copy of forms in the downstairs files for FMLA paperwork. Have paperwork sitting at the  w/ sticky note stating to collect $25 and give a copy to pt.

## 2022-05-26 ENCOUNTER — OFFICE VISIT (OUTPATIENT)
Dept: CARDIOLOGY | Facility: CLINIC | Age: 27
End: 2022-05-26

## 2022-05-26 VITALS
DIASTOLIC BLOOD PRESSURE: 88 MMHG | HEART RATE: 93 BPM | OXYGEN SATURATION: 97 % | BODY MASS INDEX: 40.15 KG/M2 | TEMPERATURE: 97.3 F | HEIGHT: 65 IN | WEIGHT: 241 LBS | SYSTOLIC BLOOD PRESSURE: 122 MMHG

## 2022-05-26 DIAGNOSIS — I27.20 PULMONARY HYPERTENSION: ICD-10-CM

## 2022-05-26 DIAGNOSIS — E78.2 MIXED HYPERLIPIDEMIA: ICD-10-CM

## 2022-05-26 DIAGNOSIS — R60.9 PERIPHERAL EDEMA: ICD-10-CM

## 2022-05-26 DIAGNOSIS — R06.02 SHORTNESS OF BREATH: ICD-10-CM

## 2022-05-26 DIAGNOSIS — R00.2 PALPITATIONS: ICD-10-CM

## 2022-05-26 DIAGNOSIS — R07.89 OTHER CHEST PAIN: Primary | ICD-10-CM

## 2022-05-26 DIAGNOSIS — Z86.16 HISTORY OF COVID-19: ICD-10-CM

## 2022-05-26 PROBLEM — J45.909 ASTHMA: Status: ACTIVE | Noted: 2022-05-26

## 2022-05-26 PROCEDURE — 99214 OFFICE O/P EST MOD 30 MIN: CPT | Performed by: NURSE PRACTITIONER

## 2022-05-26 PROCEDURE — 93000 ELECTROCARDIOGRAM COMPLETE: CPT | Performed by: NURSE PRACTITIONER

## 2022-05-26 RX ORDER — ERENUMAB-AOOE 140 MG/ML
140 INJECTION, SOLUTION SUBCUTANEOUS
COMMUNITY
End: 2022-09-07

## 2022-05-26 NOTE — PROGRESS NOTES
Subjective   Melissa Kramer is a 26 y.o. female     Chief Complaint   Patient presents with   • Follow-up       HPI    PROBLEM LIST:     1. Palpitations   1.1 event monitor 4/17-5/16/18-normal sinus rhythm, sinus tach  1.2 Event Monitor 9/7-9/20/2021-sinus rhythm, sinus tach  2. Shortness of breath   2.1 echo from [facility] 3/2/18 - LVEF 60%, normal diastolic dysfunction, trace MR, borderline pulmonary HTN systolic pressure estimated 51 mmHg.   2.2 Echo 12/3/18 - EF 56-60%; trace to mild MR, PA 37  2.3 echo 10/12/2021-EF 55 to 60%, trivial TR  3. Chest pain   3.1 Stress Test 12/3/18 - no ischemia; low risk   3.2 Cardiac CTA 11/20/19 - no sig stenosis  3.3 stress test 9/9/2021-poor quality images however suggest small, mild anteroapical and inferior apical ischemic defect.  The true apex demonstrates a fixed defect post stress and at rest.  Post-rest EF 60% with inferior basilar hypokinesis.  3.4 LHC 10/21/2021 -normal coronary arteries, EF 60 to 65%, LVEDP 14-16  4. Dizziness   5.  History of COVID-19 12/2/2020; 5/6/2022  6. Asthma, Loyda, AVINASH (Dr. Hooker's)    Patient is a 26-year-old female who presents today for a follow-up.  She recently had COVID for the second time.  Since then she has had increase in her chest pain that she describes as a pain in her left chest that goes into her left shoulder blade.  It is sharp in nature.  She says it will come and go.  She does use nitro on occasion and it does help.  She says it is very random when it occurs.  She has had an increase in her palpitations, shortness of breath, fatigue and headaches as well since having COVID.  She denies any dizziness, presyncope, syncope, orthopnea or PND.  She does get swelling in her legs but it does go down with elevation.  Again she has had increasing shortness of breath with activity and even at rest.  She does see Loyda at Dr. Hooker's office and has an appointment coming up.      Current Outpatient Medications on File Prior to Visit    Medication Sig Dispense Refill   • albuterol (PROVENTIL) (2.5 MG/3ML) 0.083% nebulizer solution      • ALBUTEROL IN 90 mcg. PRN     • aspirin (aspirin) 81 MG EC tablet Take 1 tablet by mouth Daily. 90 tablet 3   • atorvastatin (LIPITOR) 10 MG tablet Take 1 tablet by mouth Daily. 30 tablet 11   • Erenumab-aooe (Aimovig) 140 MG/ML prefilled syringe Inject 140 mg under the skin into the appropriate area as directed Every 30 (Thirty) Days.     • fluticasone-salmeterol (ADVAIR) 250-50 MCG/DOSE DISKUS Inhale 2 puffs 2 (Two) Times a Day.     • isosorbide mononitrate (IMDUR) 30 MG 24 hr tablet Take 30 mg by mouth Daily As Needed.     • labetalol (NORMODYNE) 100 MG tablet Take 1 tablet by mouth 2 (Two) Times a Day. 90 tablet 3   • nitroglycerin (NITROSTAT) 0.4 MG SL tablet 1 under the tongue as needed for angina, may repeat q5mins for up three doses 30 tablet 5   • Rimegepant Sulfate (Nurtec) 75 MG tablet dispersible tablet Take 75 mg by mouth Daily As Needed.     • sertraline (ZOLOFT) 50 MG tablet Take 50 mg by mouth Daily.     • [DISCONTINUED] TOPIRAMATE PO Take 25 mg by mouth 2 (Two) Times a Day.       No current facility-administered medications on file prior to visit.       ALLERGIES    Keflex [cephalexin]    Past Medical History:   Diagnosis Date   • Asthma    • COVID-19 12/02/2020    May 6, 2022 ( 2nd time)   • COVID-19 vaccine administered 1st- 11/17/2017 12/08/2021 - [employer]    • Mitral valve regurgitation    • Mixed hyperlipidemia 11/09/2021   • Palpitations    • Pulmonary HTN (HCC)    • Pulmonary hypertension (HCC) 09/07/2021   • TMJ (dislocation of temporomandibular joint)        Social History     Socioeconomic History   • Marital status:    Tobacco Use   • Smoking status: Never Smoker   • Smokeless tobacco: Never Used   Vaping Use   • Vaping Use: Never used   Substance and Sexual Activity   • Alcohol use: No   • Drug use: No   • Sexual activity: Yes     Partners: Male     Birth control/protection:  Implant       Family History   Problem Relation Age of Onset   • Clotting disorder Mother         acute DVt    • Cancer Mother    • Heart disease Mother         cardiac arrest   • Other Mother         respiratory failure    • No Known Problems Father    • Heart disease Maternal Grandfather    • Heart attack Maternal Grandfather        Review of Systems   Constitutional: Positive for fatigue (worse since coivd 05/06/2022 she has to take naps during the day ( weakness and no energy ) ). Negative for appetite change, chills, diaphoresis and fever.   HENT: Negative for congestion, rhinorrhea and sore throat.    Eyes: Positive for visual disturbance (glasses ( reading and computer) ).   Respiratory: Positive for cough (due to asthma ) and shortness of breath (worse since Covid 05/06/2022 ( sitting , walking or with any type of activity ) ). Negative for chest tightness and wheezing.    Cardiovascular: Positive for chest pain (above the left breast and goes into the left shoulder blade ( sharp pain ) it will come and go she has been using the Nitro ( nitro helps it) random times and worse since having Covid 05/06/2022), palpitations (worse since Covid 05/06/2022 ) and leg swelling (legs. feet and ankles swelling goes down once she puts her legs up ).   Gastrointestinal: Positive for anal bleeding (pt is seeing Alirio Napoleston ). Negative for abdominal pain, blood in stool, constipation, diarrhea, nausea and vomiting.   Endocrine: Positive for heat intolerance (night sweats ( worse since Covid 05/06/2022) ). Negative for cold intolerance.   Genitourinary: Positive for frequency (due to drinking alot of water she drinks over a gallon day ). Negative for difficulty urinating, dysuria, hematuria and urgency.   Musculoskeletal: Negative for arthralgias, back pain, joint swelling, neck pain and neck stiffness.   Skin: Negative for color change, pallor, rash and wound.   Allergic/Immunologic: Negative for environmental allergies and  "food allergies.   Neurological: Positive for headaches (chronic H/A ( worse since  having Coivd 05/06/2022) she does see Neurologist ). Negative for dizziness, weakness, light-headedness and numbness.   Hematological: Bruises/bleeds easily (due to meds ).   Psychiatric/Behavioral: Negative for sleep disturbance.       Objective   /88 (BP Location: Left arm, Patient Position: Sitting)   Pulse 93   Temp 97.3 °F (36.3 °C)   Ht 165.1 cm (65\")   Wt 109 kg (241 lb)   SpO2 97%   BMI 40.10 kg/m²   Vitals:    05/26/22 1347   BP: 122/88   BP Location: Left arm   Patient Position: Sitting   Pulse: 93   Temp: 97.3 °F (36.3 °C)   SpO2: 97%   Weight: 109 kg (241 lb)   Height: 165.1 cm (65\")      Lab Results (most recent)     None        Physical Exam  Vitals reviewed.   Constitutional:       General: She is awake.      Appearance: Normal appearance. She is well-developed and well-groomed. She is morbidly obese.   HENT:      Head: Normocephalic.   Eyes:      General: Lids are normal.   Neck:      Vascular: No carotid bruit, hepatojugular reflux or JVD.   Cardiovascular:      Rate and Rhythm: Normal rate and regular rhythm.      Pulses:           Radial pulses are 2+ on the right side and 2+ on the left side.        Dorsalis pedis pulses are 2+ on the right side and 2+ on the left side.        Posterior tibial pulses are 2+ on the right side and 2+ on the left side.      Heart sounds: Normal heart sounds.   Pulmonary:      Effort: Pulmonary effort is normal.      Breath sounds: Normal breath sounds and air entry.   Abdominal:      General: Bowel sounds are normal.      Palpations: Abdomen is soft.   Musculoskeletal:      Right lower leg: No edema.      Left lower leg: No edema.   Skin:     General: Skin is warm and dry.   Neurological:      Mental Status: She is alert and oriented to person, place, and time.   Psychiatric:         Attention and Perception: Attention and perception normal.         Mood and Affect: Mood " and affect normal.         Speech: Speech normal.         Behavior: Behavior normal. Behavior is cooperative.         Thought Content: Thought content normal.         Cognition and Memory: Cognition and memory normal.         Judgment: Judgment normal.         Procedure     ECG 12 Lead    Date/Time: 5/26/2022 2:13 PM  Performed by: Akila Cuadra APRN  Authorized by: Akila Cuadra APRN   Comparison: compared with previous ECG from 3/1/2021  Comparison to previous ECG: Right ventricular conduction delay today.  Rhythm: sinus rhythm  Rate: normal  BPM: 75  QRS axis: normal    Clinical impression: non-specific ECG                 Assessment & Plan      Diagnosis Plan   1. Other chest pain  Adult Transthoracic Echo Complete W/ Cont if Necessary Per Protocol   2. Palpitations  Adult Transthoracic Echo Complete W/ Cont if Necessary Per Protocol   3. Mixed hyperlipidemia     4. Shortness of breath  Adult Transthoracic Echo Complete W/ Cont if Necessary Per Protocol   5. Pulmonary hypertension (HCC)  Adult Transthoracic Echo Complete W/ Cont if Necessary Per Protocol   6. Peripheral edema     7. History of COVID-19  Adult Transthoracic Echo Complete W/ Cont if Necessary Per Protocol       Return in about 12 weeks (around 8/18/2022).    Chest pain/palpitation/shortness of breath/pulmonary hypertension/history of COVID-19 a second time-patient will repeat her echocardiogram.  She had a left heart cath last year with normal coronary arteries and normal LVEDP.  She can use nitro as needed for chest pain no resolution to go to the ER.  Hyperlipidemia-patient's on Lipitor and doing well.  Peripheral edema-patient does not have any signs of failure.  She will continue her medication regimen.  She will follow-up in 12 weeks or sooner if any changes or abnormalities with testing.       Melissa Kramer  reports that she has never smoked. She has never used smokeless tobacco.. Patient brought in medicine list to appointment, it's  been reviewed with patient and med list was updated in the chart.           Electronically signed by:

## 2022-05-26 NOTE — TELEPHONE ENCOUNTER
Pt paid $25 fee. I gave her a copy of the [facility] paperwork and faxed it to [phone].

## 2022-07-06 ENCOUNTER — HOSPITAL ENCOUNTER (OUTPATIENT)
Dept: CARDIOLOGY | Facility: HOSPITAL | Age: 27
Discharge: HOME | End: 2022-07-06
Admitting: NURSE PRACTITIONER

## 2022-07-06 DIAGNOSIS — R07.89 OTHER CHEST PAIN: ICD-10-CM

## 2022-07-06 DIAGNOSIS — I27.20 PULMONARY HYPERTENSION: ICD-10-CM

## 2022-07-06 DIAGNOSIS — R00.2 PALPITATIONS: ICD-10-CM

## 2022-07-06 DIAGNOSIS — R06.02 SHORTNESS OF BREATH: ICD-10-CM

## 2022-07-06 DIAGNOSIS — Z86.16 HISTORY OF COVID-19: ICD-10-CM

## 2022-07-06 PROCEDURE — 93306 TTE W/DOPPLER COMPLETE: CPT

## 2022-07-06 PROCEDURE — 93306 TTE W/DOPPLER COMPLETE: CPT | Performed by: INTERNAL MEDICINE

## 2022-07-10 LAB
BH CV ECHO MEAS - ACS: 2.04 CM
BH CV ECHO MEAS - AO MAX PG: 4.1 MMHG
BH CV ECHO MEAS - AO MEAN PG: 2.7 MMHG
BH CV ECHO MEAS - AO ROOT DIAM: 2.7 CM
BH CV ECHO MEAS - AO V2 MAX: 101.8 CM/SEC
BH CV ECHO MEAS - AO V2 VTI: 25.2 CM
BH CV ECHO MEAS - EDV(CUBED): 65.9 ML
BH CV ECHO MEAS - EDV(MOD-SP4): 99.3 ML
BH CV ECHO MEAS - EF(MOD-SP4): 68.2 %
BH CV ECHO MEAS - ESV(CUBED): 24.6 ML
BH CV ECHO MEAS - ESV(MOD-SP4): 31.6 ML
BH CV ECHO MEAS - FS: 28 %
BH CV ECHO MEAS - IVS/LVPW: 1.04 CM
BH CV ECHO MEAS - IVSD: 1.28 CM
BH CV ECHO MEAS - LA DIMENSION: 3.4 CM
BH CV ECHO MEAS - LAT PEAK E' VEL: 11 CM/SEC
BH CV ECHO MEAS - LV DIASTOLIC VOL/BSA (35-75): 46.4 CM2
BH CV ECHO MEAS - LV MASS(C)D: 179.5 GRAMS
BH CV ECHO MEAS - LV SYSTOLIC VOL/BSA (12-30): 14.8 CM2
BH CV ECHO MEAS - LVIDD: 4 CM
BH CV ECHO MEAS - LVIDS: 2.9 CM
BH CV ECHO MEAS - LVOT AREA: 3.5 CM2
BH CV ECHO MEAS - LVOT DIAM: 2.1 CM
BH CV ECHO MEAS - LVPWD: 1.23 CM
BH CV ECHO MEAS - MED PEAK E' VEL: 6.9 CM/SEC
BH CV ECHO MEAS - MV A MAX VEL: 56.6 CM/SEC
BH CV ECHO MEAS - MV DEC SLOPE: 389.8 CM/SEC2
BH CV ECHO MEAS - MV E MAX VEL: 79.3 CM/SEC
BH CV ECHO MEAS - MV E/A: 1.4
BH CV ECHO MEAS - RVDD: 3.5 CM
BH CV ECHO MEAS - SI(MOD-SP4): 31.6 ML/M2
BH CV ECHO MEAS - SV(MOD-SP4): 67.7 ML
BH CV ECHO MEASUREMENTS AVERAGE E/E' RATIO: 8.86
LEFT ATRIUM VOLUME INDEX: 17.1 ML/M2
MAXIMAL PREDICTED HEART RATE: 195 BPM
STRESS TARGET HR: 166 BPM

## 2022-07-11 ENCOUNTER — TELEPHONE (OUTPATIENT)
Dept: CARDIOLOGY | Facility: CLINIC | Age: 27
End: 2022-07-11

## 2022-07-11 NOTE — TELEPHONE ENCOUNTER
----- Message from Jesús Arreaga sent at 7/11/2022  2:50 PM EDT -----  Regarding: FW:    ----- Message -----  From: Akila Cuadra APRN  Sent: 7/10/2022   3:32 PM EDT  To: Mariana Mendoza PCT  Subject: FW:                                              Please advise patient  ----- Message -----  From: Néstor Lloyd MD  Sent: 7/10/2022   2:51 PM EDT  To: AVINASH Box

## 2022-08-22 ENCOUNTER — TELEPHONE (OUTPATIENT)
Dept: CARDIOLOGY | Facility: CLINIC | Age: 27
End: 2022-08-22

## 2022-08-22 NOTE — TELEPHONE ENCOUNTER
Patient called and just found out she was pregnant, and with test results of echo wants to see if she needs sooner bridgette.    Per AVINASH Aragon  Patients echo was normal, she is on the right medication (Labetolol) unless any issues can keep follow up bridgette.    Patient verbally understands.    DONNY AGUILA MA

## 2022-09-07 ENCOUNTER — OFFICE VISIT (OUTPATIENT)
Dept: CARDIOLOGY | Facility: CLINIC | Age: 27
End: 2022-09-07

## 2022-09-07 VITALS
HEART RATE: 77 BPM | WEIGHT: 242 LBS | OXYGEN SATURATION: 98 % | SYSTOLIC BLOOD PRESSURE: 115 MMHG | TEMPERATURE: 98.2 F | BODY MASS INDEX: 40.32 KG/M2 | DIASTOLIC BLOOD PRESSURE: 79 MMHG | HEIGHT: 65 IN

## 2022-09-07 DIAGNOSIS — Z86.16 HISTORY OF COVID-19: ICD-10-CM

## 2022-09-07 DIAGNOSIS — R00.2 PALPITATIONS: Primary | ICD-10-CM

## 2022-09-07 DIAGNOSIS — R60.9 PERIPHERAL EDEMA: ICD-10-CM

## 2022-09-07 DIAGNOSIS — E78.2 MIXED HYPERLIPIDEMIA: ICD-10-CM

## 2022-09-07 DIAGNOSIS — R06.02 SHORTNESS OF BREATH: ICD-10-CM

## 2022-09-07 DIAGNOSIS — I27.20 PULMONARY HYPERTENSION: ICD-10-CM

## 2022-09-07 DIAGNOSIS — R07.89 OTHER CHEST PAIN: ICD-10-CM

## 2022-09-07 PROBLEM — Z3A.01 LESS THAN 8 WEEKS GESTATION OF PREGNANCY: Status: ACTIVE | Noted: 2022-09-07

## 2022-09-07 PROCEDURE — 99213 OFFICE O/P EST LOW 20 MIN: CPT | Performed by: NURSE PRACTITIONER

## 2022-09-07 RX ORDER — PRENATAL VIT NO.126/IRON/FOLIC 28MG-0.8MG
1 TABLET ORAL DAILY
COMMUNITY

## 2022-09-07 NOTE — PROGRESS NOTES
Subjective   Melissa Kramer is a 26 y.o. female     Chief Complaint   Patient presents with   • Follow-up     Chest Pain        HPI    PROBLEM LIST:     1. Palpitations   1.1 event monitor 4/17-5/16/18-normal sinus rhythm, sinus tach  1.2 Event Monitor 9/7-9/20/2021-sinus rhythm, sinus tach  2. Shortness of breath   2.1 echo from [facility] 3/2/18 - LVEF 60%, normal diastolic dysfunction, trace MR, borderline pulmonary HTN systolic pressure estimated 51 mmHg.   2.2 Echo 12/3/18 - EF 56-60%; trace to mild MR, PA 37  2.3 echo 10/12/2021-EF 55 to 60%, trivial TR  2.4 Echo 7/6/2022-EF 55 to 60%, trivial MR and physiological TR  3. Chest pain   3.1 Stress Test 12/3/18 - no ischemia; low risk   3.2 Cardiac CTA 11/20/19 - no sig stenosis  3.3 stress test 9/9/2021-poor quality images however suggest small, mild anteroapical and inferior apical ischemic defect.  The true apex demonstrates a fixed defect post stress and at rest.  Post-rest EF 60% with inferior basilar hypokinesis.  3.4 C 10/21/2021 -normal coronary arteries, EF 60 to 65%, LVEDP 14-16  4. Dizziness   5.  History of COVID-19 12/2/2020; 5/6/2022  6. Asthma, AVINASH Scales (Dr. Adamss)  7.  7 weeks pregnant 9/7/2022    Patient is a 26-year-old female who presents today for follow-up on echocardiogram.  She says she does continue to have chest pain with her palpitations but that the labetalol really has helped her.  She denies any dizziness, presyncope, syncope, orthopnea or PND.  She says she gets swelling in her legs especially if she is on them for very long time.  She says for the most part able go down overnight.  She does have shortness of breath due to her asthma.  She has had a decrease in appetite and fatigue due to being 7 weeks pregnant.    We went over her echo.    Current Outpatient Medications on File Prior to Visit   Medication Sig Dispense Refill   • albuterol (PROVENTIL) (2.5 MG/3ML) 0.083% nebulizer solution      • ALBUTEROL IN 90 mcg. PRN     •  fluticasone-salmeterol (ADVAIR) 250-50 MCG/DOSE DISKUS Inhale 2 puffs 2 (Two) Times a Day.     • labetalol (NORMODYNE) 100 MG tablet Take 1 tablet by mouth 2 (Two) Times a Day. 90 tablet 3   • nitroglycerin (NITROSTAT) 0.4 MG SL tablet 1 under the tongue as needed for angina, may repeat q5mins for up three doses 30 tablet 5   • prenatal vitamin (prenatal, CLASSIC, vitamin) tablet Take 1 tablet by mouth Daily.     • [DISCONTINUED] aspirin (aspirin) 81 MG EC tablet Take 1 tablet by mouth Daily. (Patient taking differently: Take 81 mg by mouth Every Other Day.) 90 tablet 3   • [DISCONTINUED] atorvastatin (LIPITOR) 10 MG tablet Take 1 tablet by mouth Daily. 30 tablet 11   • [DISCONTINUED] sertraline (ZOLOFT) 50 MG tablet Take 50 mg by mouth Daily.     • [DISCONTINUED] Erenumab-aooe (Aimovig) 140 MG/ML prefilled syringe Inject 140 mg under the skin into the appropriate area as directed Every 30 (Thirty) Days.     • [DISCONTINUED] isosorbide mononitrate (IMDUR) 30 MG 24 hr tablet Take 30 mg by mouth Daily As Needed.     • [DISCONTINUED] Rimegepant Sulfate (Nurtec) 75 MG tablet dispersible tablet Take 75 mg by mouth Daily As Needed.       No current facility-administered medications on file prior to visit.       ALLERGIES    Keflex [cephalexin]    Past Medical History:   Diagnosis Date   • Asthma    • COVID-19 12/02/2020    May 6, 2022 ( 2nd time)   • COVID-19 vaccine administered 1st- 11/17/2017 12/08/2021 - angel    • Mitral valve regurgitation    • Mixed hyperlipidemia 11/09/2021   • Palpitations    • Pulmonary HTN (HCC)    • Pulmonary hypertension (HCC) 09/07/2021   • TMJ (dislocation of temporomandibular joint)        Social History     Socioeconomic History   • Marital status:    Tobacco Use   • Smoking status: Never Smoker   • Smokeless tobacco: Never Used   Vaping Use   • Vaping Use: Never used   Substance and Sexual Activity   • Alcohol use: No   • Drug use: No   • Sexual activity: Yes     Partners:  Male     Birth control/protection: Implant       Family History   Problem Relation Age of Onset   • Clotting disorder Mother         acute DVt    • Cancer Mother    • Heart disease Mother         cardiac arrest   • Other Mother         respiratory failure    • No Known Problems Father    • Heart disease Maternal Grandfather    • Heart attack Maternal Grandfather        Review of Systems   Constitutional: Positive for appetite change (decreased ( due to being 7 weeks pregnant ) ) and fatigue (fatigued due to being pregnant ). Negative for chills, diaphoresis and fever.   HENT: Negative for congestion, rhinorrhea and sore throat.    Eyes: Negative for visual disturbance.   Respiratory: Positive for cough (dry cough ) and shortness of breath (sob due to Asthma ). Negative for chest tightness and wheezing.    Cardiovascular: Positive for chest pain (center of the chest ( sharp pain ) meds have helped some ( it will come and go ), labetalol ), palpitations (all the time ( happens at random times ) ) and leg swelling (legs, feet and ankles swelling will go down at night; sometimes if on feet a lot will still be there, tries to keep feet up ).   Gastrointestinal: Positive for nausea (all day long due to being pregnant ). Negative for abdominal pain, blood in stool, constipation, diarrhea and vomiting.   Endocrine: Positive for cold intolerance (cold sweats ) and heat intolerance (night sweats , hot flahses ).   Genitourinary: Negative for difficulty urinating, dysuria, frequency, hematuria and urgency.   Musculoskeletal: Negative for arthralgias, back pain, joint swelling, neck pain and neck stiffness.   Skin: Negative for color change, pallor, rash and wound.   Allergic/Immunologic: Negative for environmental allergies and food allergies.   Neurological: Positive for headaches (H/O of Tenssion H/A ). Negative for dizziness, syncope, weakness, light-headedness and numbness.   Hematological: Bruises/bleeds easily (due to  "meds ).   Psychiatric/Behavioral: Negative for sleep disturbance.       Objective   /79 (BP Location: Left arm, Patient Position: Sitting)   Pulse 77   Temp 98.2 °F (36.8 °C)   Ht 165.1 cm (65\")   Wt 110 kg (242 lb)   SpO2 98%   BMI 40.27 kg/m²   Vitals:    09/07/22 1505   BP: 115/79   BP Location: Left arm   Patient Position: Sitting   Pulse: 77   Temp: 98.2 °F (36.8 °C)   SpO2: 98%   Weight: 110 kg (242 lb)   Height: 165.1 cm (65\")      Lab Results (most recent)     None        Physical Exam  Vitals reviewed.   Constitutional:       General: She is awake.      Appearance: Normal appearance. She is well-developed and well-groomed. She is morbidly obese.   HENT:      Head: Normocephalic.   Eyes:      General: Lids are normal.   Neck:      Vascular: No carotid bruit, hepatojugular reflux or JVD.   Cardiovascular:      Rate and Rhythm: Normal rate and regular rhythm.      Pulses:           Radial pulses are 2+ on the right side and 2+ on the left side.        Dorsalis pedis pulses are 2+ on the right side and 2+ on the left side.        Posterior tibial pulses are 2+ on the right side and 2+ on the left side.      Heart sounds: Normal heart sounds.   Pulmonary:      Effort: Pulmonary effort is normal.      Breath sounds: Normal breath sounds and air entry.   Abdominal:      General: Bowel sounds are normal.      Palpations: Abdomen is soft.   Musculoskeletal:      Right lower leg: No edema.      Left lower leg: No edema.   Skin:     General: Skin is warm and dry.   Neurological:      Mental Status: She is alert and oriented to person, place, and time.   Psychiatric:         Attention and Perception: Attention and perception normal.         Mood and Affect: Mood and affect normal.         Speech: Speech normal.         Behavior: Behavior normal. Behavior is cooperative.         Thought Content: Thought content normal.         Cognition and Memory: Cognition and memory normal.         Judgment: Judgment " normal.         Procedure   Procedures         Assessment & Plan      Diagnosis Plan   1. Palpitations     2. Shortness of breath     3. Mixed hyperlipidemia     4. Pulmonary hypertension (HCC)     5. Peripheral edema     6. History of COVID-19     7. Other chest pain         Return in about 1 year (around 9/7/2023).    Palpitations-stable on labetalol.  Shortness of breath-stable.  Hyperlipidemia-patient will stop Lipitor while pregnant.  Pulmonary hypertension-stable.  Peripheral edema-resolved with elevation.  History of COVID-19-stable EF.  Chest pain-atypical and more related to her palpitations.  Patient will also discontinue her aspirin as she no longer needs this either.  Patient will continue her medication regimen otherwise.  She will follow-up in 1 year sooner if any changes.      Patient is aware to discontinue her aspirin and statin.    Melissa Kramer  reports that she has never smoked. She has never used smokeless tobacco...Patient updated her medication list on My Chart     Electronically signed by:

## 2022-11-08 DIAGNOSIS — R00.2 PALPITATIONS: ICD-10-CM

## 2022-11-09 RX ORDER — LABETALOL 100 MG/1
TABLET, FILM COATED ORAL
Qty: 90 TABLET | Refills: 3 | Status: SHIPPED | OUTPATIENT
Start: 2022-11-09

## 2023-07-19 ENCOUNTER — TELEPHONE (OUTPATIENT)
Dept: CARDIOLOGY | Facility: CLINIC | Age: 28
End: 2023-07-19

## 2023-07-19 NOTE — TELEPHONE ENCOUNTER
Caller: WilliamsMelissa    Relationship: Self    Best call back number: [phone]     What is the best time to reach you: ANY    Who are you requesting to speak with (clinical staff, provider,  specific staff member): CLINICAL      What was the call regarding: PT IS WANTING TO KNOW IF SHE CAN UPLOAD HER FMLA PW TO BE FILLED OUT AS SHE NEEDS IT FOR WORK OR CAN SHE STOP BY AND DROP THE PW OFF. PT WAS A PT OF [employer] AND HAS BEEN RS'D WITH ALEXANDRU BUT CAN'T GET IN TO SEE ALEXANDRU UNTIL RICHARD AND NEEDS THE PW ASAP    Is it okay if the provider responds through MyChart: NO

## 2023-11-01 DIAGNOSIS — R00.2 PALPITATIONS: ICD-10-CM

## 2023-11-01 RX ORDER — LABETALOL 100 MG/1
TABLET, FILM COATED ORAL
Qty: 90 TABLET | Refills: 3 | Status: SHIPPED | OUTPATIENT
Start: 2023-11-01

## 2024-03-19 ENCOUNTER — OFFICE VISIT (OUTPATIENT)
Dept: CARDIOLOGY | Facility: CLINIC | Age: 29
End: 2024-03-19
Payer: COMMERCIAL

## 2024-03-19 VITALS
SYSTOLIC BLOOD PRESSURE: 115 MMHG | WEIGHT: 221.4 LBS | DIASTOLIC BLOOD PRESSURE: 80 MMHG | OXYGEN SATURATION: 98 % | HEIGHT: 65 IN | BODY MASS INDEX: 36.89 KG/M2 | HEART RATE: 96 BPM

## 2024-03-19 DIAGNOSIS — R00.2 PALPITATIONS: ICD-10-CM

## 2024-03-19 DIAGNOSIS — R07.89 OTHER CHEST PAIN: ICD-10-CM

## 2024-03-19 DIAGNOSIS — R06.02 SHORTNESS OF BREATH: Primary | ICD-10-CM

## 2024-03-19 DIAGNOSIS — I27.20 PULMONARY HYPERTENSION: ICD-10-CM

## 2024-03-19 PROCEDURE — 93000 ELECTROCARDIOGRAM COMPLETE: CPT | Performed by: CLINICAL NURSE SPECIALIST

## 2024-03-19 PROCEDURE — 99214 OFFICE O/P EST MOD 30 MIN: CPT | Performed by: CLINICAL NURSE SPECIALIST

## 2024-03-19 RX ORDER — FLUTICASONE PROPIONATE 50 MCG
1 SPRAY, SUSPENSION (ML) NASAL AS NEEDED
COMMUNITY
Start: 2024-03-13

## 2024-03-19 RX ORDER — RIZATRIPTAN BENZOATE 10 MG/1
10 TABLET, ORALLY DISINTEGRATING ORAL AS NEEDED
COMMUNITY
Start: 2024-03-13

## 2024-03-19 RX ORDER — NITROGLYCERIN 0.4 MG/1
TABLET SUBLINGUAL
Qty: 25 TABLET | Refills: 2 | Status: SHIPPED | OUTPATIENT
Start: 2024-03-19

## 2024-03-19 RX ORDER — ATENOLOL 25 MG/1
25 TABLET ORAL DAILY
Qty: 30 TABLET | Refills: 11 | Status: SHIPPED | OUTPATIENT
Start: 2024-03-19

## 2024-03-19 RX ORDER — RANOLAZINE 500 MG/1
500 TABLET, EXTENDED RELEASE ORAL 2 TIMES DAILY
Qty: 60 TABLET | Refills: 3 | Status: SHIPPED | OUTPATIENT
Start: 2024-03-19

## 2024-03-19 RX ORDER — FREMANEZUMAB-VFRM 225 MG/1.5ML
INJECTION SUBCUTANEOUS
COMMUNITY
Start: 2024-01-30
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
## 2024-03-25 ENCOUNTER — TELEPHONE (OUTPATIENT)
Dept: CARDIOLOGY | Facility: CLINIC | Age: 29
End: 2024-03-25
Payer: COMMERCIAL

## 2024-03-25 NOTE — TELEPHONE ENCOUNTER
Pt is requesting FMLA to be completed for visits, with episodes of chest pain and ER visits if needed to keep her from getting fired from her job.

## 2024-03-26 NOTE — TELEPHONE ENCOUNTER
Notified pt of AVINASH Dash's recommendations. Pt states that the cardiology part has to be filled out from cardiology because her PCP will not fill it out.  Pt verbalizes understanding of AVINASH Dash's recommendations and is aware that I will leave FMLA paperwork at  to .      Lexie Hebert APRN Welch, Melissa, LPN  Caller: Unspecified (Yesterday,  3:49 PM)  Would prefer FMLA be sent to PCP.  We typically do not do FMLA unless there is a procedure.

## 2024-04-19 ENCOUNTER — HOSPITAL ENCOUNTER (OUTPATIENT)
Dept: CARDIOLOGY | Facility: HOSPITAL | Age: 29
Discharge: HOME | End: 2024-04-19
Payer: COMMERCIAL

## 2024-04-19 VITALS — HEIGHT: 65 IN | WEIGHT: 220.46 LBS | BODY MASS INDEX: 36.73 KG/M2

## 2024-04-19 DIAGNOSIS — I27.20 PULMONARY HYPERTENSION: ICD-10-CM

## 2024-04-19 DIAGNOSIS — R06.02 SHORTNESS OF BREATH: ICD-10-CM

## 2024-04-19 LAB
AORTIC DIMENSIONLESS INDEX: 0.91 (DI)
BH CV ECHO MEAS - AO MAX PG: 5 MMHG
BH CV ECHO MEAS - AO MEAN PG: 2.5 MMHG
BH CV ECHO MEAS - AO ROOT DIAM: 2.6 CM
BH CV ECHO MEAS - AO V2 MAX: 112.1 CM/SEC
BH CV ECHO MEAS - AO V2 VTI: 20.2 CM
BH CV ECHO MEAS - EDV(CUBED): 87.2 ML
BH CV ECHO MEAS - ESV(CUBED): 31.2 ML
BH CV ECHO MEAS - FS: 29 %
BH CV ECHO MEAS - IVS/LVPW: 1.24 CM
BH CV ECHO MEAS - IVSD: 1.25 CM
BH CV ECHO MEAS - LA DIMENSION: 3.6 CM
BH CV ECHO MEAS - LAT PEAK E' VEL: 10.7 CM/SEC
BH CV ECHO MEAS - LV MASS(C)D: 177.8 GRAMS
BH CV ECHO MEAS - LV MAX PG: 2.7 MMHG
BH CV ECHO MEAS - LV MEAN PG: 1.35 MMHG
BH CV ECHO MEAS - LV V1 MAX: 82 CM/SEC
BH CV ECHO MEAS - LV V1 VTI: 18.6 CM
BH CV ECHO MEAS - LVIDD: 4.4 CM
BH CV ECHO MEAS - LVIDS: 3.1 CM
BH CV ECHO MEAS - LVPWD: 1.01 CM
BH CV ECHO MEAS - MED PEAK E' VEL: 6.5 CM/SEC
BH CV ECHO MEAS - MV A MAX VEL: 49.7 CM/SEC
BH CV ECHO MEAS - MV DEC SLOPE: 428.5 CM/SEC2
BH CV ECHO MEAS - MV DEC TIME: 0.22 SEC
BH CV ECHO MEAS - MV E MAX VEL: 67.7 CM/SEC
BH CV ECHO MEAS - MV E/A: 1.36
BH CV ECHO MEAS - MV MAX PG: 3.1 MMHG
BH CV ECHO MEAS - MV MEAN PG: 1.12 MMHG
BH CV ECHO MEAS - MV P1/2T: 58.8 MSEC
BH CV ECHO MEAS - MV V2 VTI: 20.4 CM
BH CV ECHO MEAS - MVA(P1/2T): 3.7 CM2
BH CV ECHO MEAS - PA V2 MAX: 106.3 CM/SEC
BH CV ECHO MEAS - RV MAX PG: 1.83 MMHG
BH CV ECHO MEAS - RV V1 MAX: 67.7 CM/SEC
BH CV ECHO MEAS - RV V1 VTI: 12.7 CM
BH CV ECHO MEAS - RVDD: 3.5 CM
BH CV ECHO MEAS - TAPSE (>1.6): 1.63 CM
BH CV ECHO MEASUREMENTS AVERAGE E/E' RATIO: 7.87
BH CV XLRA - TDI S': 10 CM/SEC
SINUS: 2.5 CM

## 2024-04-19 PROCEDURE — 93306 TTE W/DOPPLER COMPLETE: CPT

## 2024-07-15 RX ORDER — RANOLAZINE 500 MG/1
500 TABLET, EXTENDED RELEASE ORAL 2 TIMES DAILY
Qty: 180 TABLET | Refills: 0 | Status: SHIPPED | OUTPATIENT
Start: 2024-07-15

## 2024-10-21 RX ORDER — RANOLAZINE 500 MG/1
500 TABLET, EXTENDED RELEASE ORAL 2 TIMES DAILY
Qty: 180 TABLET | Refills: 0 | Status: SHIPPED | OUTPATIENT
Start: 2024-10-21

## 2024-12-23 ENCOUNTER — OFFICE VISIT (OUTPATIENT)
Dept: CARDIOLOGY | Facility: CLINIC | Age: 29
End: 2024-12-23
Payer: COMMERCIAL

## 2024-12-23 VITALS
DIASTOLIC BLOOD PRESSURE: 98 MMHG | SYSTOLIC BLOOD PRESSURE: 147 MMHG | HEART RATE: 85 BPM | BODY MASS INDEX: 38.65 KG/M2 | HEIGHT: 65 IN | WEIGHT: 232 LBS | OXYGEN SATURATION: 97 %

## 2024-12-23 DIAGNOSIS — R06.02 SHORTNESS OF BREATH: Primary | ICD-10-CM

## 2024-12-23 DIAGNOSIS — R07.9 CHEST PAIN, UNSPECIFIED TYPE: ICD-10-CM

## 2024-12-23 DIAGNOSIS — R00.2 PALPITATIONS: ICD-10-CM

## 2024-12-23 PROCEDURE — 99214 OFFICE O/P EST MOD 30 MIN: CPT | Performed by: PHYSICIAN ASSISTANT

## 2024-12-23 RX ORDER — ISOSORBIDE MONONITRATE 30 MG/1
30 TABLET, EXTENDED RELEASE ORAL EVERY MORNING
Qty: 30 TABLET | Refills: 11 | Status: SHIPPED | OUTPATIENT
Start: 2024-12-23

## 2024-12-23 NOTE — LETTER
December 23, 2024     AVINASH Golden  [address]  [facility] [address]    Patient: Melissa Kramer   YOB: 1995   Date of Visit: 12/23/2024       Dear AVINASH Golden    Melissa Kramer was in my office today. Below is a copy of my note.    If you have questions, please do not hesitate to call me. I look forward to following Melissa along with you.         Sincerely,        LESVIA Penn        CC: No Recipients    Problem list     Subjective  Melissa Kramer is a 29 y.o. female     Chief Complaint   Patient presents with   • Follow-up     palpitations   • Chest Pain     Active Problems:  1. Palpitations   1.1 event monitor 4/17-5/16/18-normal sinus rhythm, sinus tach  1.2 Event Monitor 9/7-9/20/2021-sinus rhythm, sinus tach  2. Shortness of breath   2.1 echo from [facility] 3/2/18 - LVEF 60%, normal diastolic dysfunction, trace MR, borderline pulmonary HTN systolic pressure estimated 51 mmHg.   2.2 Echo 12/3/18 - EF 56-60%; trace to mild MR, PA 37  2.3 echo 10/12/2021-EF 55 to 60%, trivial TR  2.4 Echo 7/6/2022-EF 55 to 60%, trivial MR and physiological TR  3. Chest pain   3.1 Stress Test 12/3/18 - no ischemia; low risk   3.2 Cardiac CTA 11/20/19 - no sig stenosis  3.3 stress test 9/9/2021-poor quality images however suggest small, mild anteroapical and inferior apical ischemic defect.  The true apex demonstrates a fixed defect post stress and at rest.  Post-rest EF 60% with inferior basilar hypokinesis.  3.4 C 10/21/2021 -normal coronary arteries, EF 60 to 65%, LVEDP 14-16  4. Dizziness   5.  History of COVID-19 12/2/2020; 5/6/2022  6. Asthma, AVINASH Scales (Dr. [facility])  HPI    Patient is a 29-year-old female to the office to be assessed.  Patient  has been having significant issues.  Patient has been experiencing more chest discomfort than what she had in the past.  She is underwent cardiac evaluation in the past which has been negative.  However, she has been experiencing discomfort near  the substernal region.  She also feels significantly dyspneic when trying to exert or do activity.  She does not describe PND or orthopnea.    She does palpitate.  She has chronic palpitations.  She has worn monitors in the past with sinus tachycardia noted but no significant arrhythmias.  Otherwise, she is stable.      Current Outpatient Medications on File Prior to Visit   Medication Sig Dispense Refill   • Ajovy 225 MG/1.5ML solution auto-injector Every 30 (Thirty) Days.     • albuterol (PROVENTIL) (2.5 MG/3ML) 0.083% nebulizer solution      • ALBUTEROL IN 90 mcg. PRN     • atenolol (TENORMIN) 25 MG tablet Take 1 tablet by mouth Daily. 30 tablet 11   • fluticasone (FLONASE) 50 MCG/ACT nasal spray Administer 1 spray into the nostril(s) as directed by provider As Needed.     • fluticasone-salmeterol (ADVAIR) 250-50 MCG/DOSE DISKUS Inhale 2 puffs 2 (Two) Times a Day.     • nitroglycerin (NITROSTAT) 0.4 MG SL tablet 1 under the tongue as needed for angina, may repeat q5mins for up three doses 25 tablet 2   • ranolazine (RANEXA) 500 MG 12 hr tablet TAKE 1 TABLET BY MOUTH 2 TIMES A  tablet 0   • rizatriptan MLT (MAXALT-MLT) 10 MG disintegrating tablet Place 1 tablet on the tongue As Needed.     • sertraline (ZOLOFT) 50 MG tablet Take 1 tablet by mouth Daily.       No current facility-administered medications on file prior to visit.       Keflex [cephalexin]    Past Medical History:   Diagnosis Date   • Asthma    • COVID-19 12/02/2020    May 6, 2022 ( 2nd time)   • COVID-19 vaccine administered 1st- 11/17/2017 12/08/2021 - zpfizer    • Migraines    • Mitral valve regurgitation    • Mixed hyperlipidemia 11/09/2021   • Palpitations    • Post concussion syndrome    • Pulmonary HTN    • Pulmonary hypertension 09/07/2021   • TMJ (dislocation of temporomandibular joint)        Social History     Socioeconomic History   • Marital status:    Tobacco Use   • Smoking status: Never   • Smokeless tobacco: Never    Vaping Use   • Vaping status: Every Day   Substance and Sexual Activity   • Alcohol use: Not Currently     Comment: occasionally   • Drug use: No   • Sexual activity: Yes     Partners: Male     Birth control/protection: Tubal ligation       Family History   Problem Relation Age of Onset   • Clotting disorder Mother         acute DVt    • Cancer Mother    • Heart disease Mother         cardiac arrest   • Other Mother         respiratory failure    • No Known Problems Father    • Heart disease Maternal Grandfather    • Heart attack Maternal Grandfather    • Anemia Maternal Grandfather    • Clotting disorder Maternal Grandfather    • Heart failure Maternal Grandfather        Review of Systems   Constitutional: Negative.  Negative for activity change, appetite change, chills, fatigue and fever.   HENT: Negative.  Negative for congestion, sinus pressure and sinus pain.    Eyes: Negative.  Negative for visual disturbance.   Respiratory:  Positive for shortness of breath. Negative for apnea, cough, chest tightness and wheezing.    Cardiovascular:  Positive for chest pain and palpitations. Negative for leg swelling.   Gastrointestinal: Negative.  Negative for blood in stool.   Endocrine: Negative.  Negative for cold intolerance and heat intolerance.   Genitourinary: Negative.  Negative for hematuria.   Musculoskeletal: Negative.  Negative for gait problem.   Skin: Negative.  Negative for color change, rash and wound.   Allergic/Immunologic: Negative.  Negative for environmental allergies and food allergies.   Neurological:  Positive for dizziness. Negative for syncope, weakness, light-headedness, numbness and headaches.   Hematological: Negative.  Does not bruise/bleed easily.   Psychiatric/Behavioral: Negative.  Negative for sleep disturbance.        Objective  Vitals:    12/23/24 0933   BP: 147/98   BP Location: Right arm   Patient Position: Sitting   Cuff Size: Adult   Pulse: 85   SpO2: 97%   Weight: 105 kg (232 lb)  "  Height: 165.1 cm (65\")      /98 (BP Location: Right arm, Patient Position: Sitting, Cuff Size: Adult)   Pulse 85   Ht 165.1 cm (65\")   Wt 105 kg (232 lb)   SpO2 97%   BMI 38.61 kg/m²     Lab Results (most recent)       None            Physical Exam  Vitals and nursing note reviewed.   Constitutional:       General: She is not in acute distress.     Appearance: Normal appearance. She is well-developed.   HENT:      Head: Normocephalic and atraumatic.   Eyes:      General: No scleral icterus.        Right eye: No discharge.         Left eye: No discharge.      Conjunctiva/sclera: Conjunctivae normal.   Neck:      Vascular: No carotid bruit.   Cardiovascular:      Rate and Rhythm: Normal rate and regular rhythm.      Heart sounds: Normal heart sounds. No murmur heard.     No friction rub. No gallop.   Pulmonary:      Effort: Pulmonary effort is normal. No respiratory distress.      Breath sounds: Normal breath sounds. No wheezing or rales.   Chest:      Chest wall: No tenderness.   Musculoskeletal:      Right lower leg: No edema.      Left lower leg: No edema.   Skin:     General: Skin is warm and dry.      Coloration: Skin is not pale.      Findings: No erythema or rash.   Neurological:      Mental Status: She is alert and oriented to person, place, and time.      Cranial Nerves: No cranial nerve deficit.   Psychiatric:         Behavior: Behavior normal.         Procedure  Procedures       Assessment & Plan    Problems Addressed this Visit          Cardiac and Vasculature    Chest pain    Relevant Orders    Treadmill Stress Test    Palpitations    Relevant Orders    Treadmill Stress Test       Pulmonary and Pneumonias    Shortness of breath - Primary    Relevant Orders    Treadmill Stress Test     Diagnoses         Codes Comments    Shortness of breath    -  Primary ICD-10-CM: R06.02  ICD-9-CM: 786.05     Palpitations     ICD-10-CM: R00.2  ICD-9-CM: 785.1     Chest pain, unspecified type     ICD-10-CM: " R07.9  ICD-9-CM: 786.50             Recommendations  1.  Patient is a 29-year-old female presenting for evaluation.  She has  acute on chronic chest discomfort with previous cardiac workups being benign.  I am scheduling regular treadmill stress test to evaluate.    2.  I will put her on long-acting nitrates in the setting that she may have vasospastic angina as a cause of her symptoms.  Will see how she responds.    3.  Any chest pain, not resolved by nitroglycerin, I recommend ER evaluation.    4.  We will see her back for follow-up on the above and recommend further.  Follow-up with primary as scheduled.     Patient did not bring med list or medicine bottles to appointment, med list has been reviewed and updated based on patient's knowledge of their meds.      Electronically signed by:

## 2024-12-23 NOTE — PROGRESS NOTES
Problem list     Subjective   Melissa Kramer is a 29 y.o. female     Chief Complaint   Patient presents with    Follow-up     palpitations    Chest Pain     Active Problems:  1. Palpitations   1.1 event monitor 4/17-5/16/18-normal sinus rhythm, sinus tach  1.2 Event Monitor 9/7-9/20/2021-sinus rhythm, sinus tach  2. Shortness of breath   2.1 echo from [facility] 3/2/18 - LVEF 60%, normal diastolic dysfunction, trace MR, borderline pulmonary HTN systolic pressure estimated 51 mmHg.   2.2 Echo 12/3/18 - EF 56-60%; trace to mild MR, PA 37  2.3 echo 10/12/2021-EF 55 to 60%, trivial TR  2.4 Echo 7/6/2022-EF 55 to 60%, trivial MR and physiological TR  3. Chest pain   3.1 Stress Test 12/3/18 - no ischemia; low risk   3.2 Cardiac CTA 11/20/19 - no sig stenosis  3.3 stress test 9/9/2021-poor quality images however suggest small, mild anteroapical and inferior apical ischemic defect.  The true apex demonstrates a fixed defect post stress and at rest.  Post-rest EF 60% with inferior basilar hypokinesis.  3.4 C 10/21/2021 -normal coronary arteries, EF 60 to 65%, LVEDP 14-16  4. Dizziness   5.  History of COVID-19 12/2/2020; 5/6/2022  6. Asthma, Loyda, APRN (Dr. Hooker's)  HPI    Patient is a 29-year-old female to the office to be assessed.  Patient  has been having significant issues.  Patient has been experiencing more chest discomfort than what she had in the past.  She is underwent cardiac evaluation in the past which has been negative.  However, she has been experiencing discomfort near the substernal region.  She also feels significantly dyspneic when trying to exert or do activity.  She does not describe PND or orthopnea.    She does palpitate.  She has chronic palpitations.  She has worn monitors in the past with sinus tachycardia noted but no significant arrhythmias.  Otherwise, she is stable.      Current Outpatient Medications on File Prior to Visit   Medication Sig Dispense Refill    Ajovy 225 MG/1.5ML solution auto-injector  Every 30 (Thirty) Days.      albuterol (PROVENTIL) (2.5 MG/3ML) 0.083% nebulizer solution       ALBUTEROL IN 90 mcg. PRN      atenolol (TENORMIN) 25 MG tablet Take 1 tablet by mouth Daily. 30 tablet 11    fluticasone (FLONASE) 50 MCG/ACT nasal spray Administer 1 spray into the nostril(s) as directed by provider As Needed.      fluticasone-salmeterol (ADVAIR) 250-50 MCG/DOSE DISKUS Inhale 2 puffs 2 (Two) Times a Day.      nitroglycerin (NITROSTAT) 0.4 MG SL tablet 1 under the tongue as needed for angina, may repeat q5mins for up three doses 25 tablet 2    ranolazine (RANEXA) 500 MG 12 hr tablet TAKE 1 TABLET BY MOUTH 2 TIMES A  tablet 0    rizatriptan MLT (MAXALT-MLT) 10 MG disintegrating tablet Place 1 tablet on the tongue As Needed.      sertraline (ZOLOFT) 50 MG tablet Take 1 tablet by mouth Daily.       No current facility-administered medications on file prior to visit.       Keflex [cephalexin]    Past Medical History:   Diagnosis Date    Asthma     COVID-19 12/02/2020    May 6, 2022 ( 2nd time)    COVID-19 vaccine administered 1st- 11/17/2017 12/08/2021 - zpfizer     Migraines     Mitral valve regurgitation     Mixed hyperlipidemia 11/09/2021    Palpitations     Post concussion syndrome     Pulmonary HTN     Pulmonary hypertension 09/07/2021    TMJ (dislocation of temporomandibular joint)        Social History     Socioeconomic History    Marital status:    Tobacco Use    Smoking status: Never    Smokeless tobacco: Never   Vaping Use    Vaping status: Every Day   Substance and Sexual Activity    Alcohol use: Not Currently     Comment: occasionally    Drug use: No    Sexual activity: Yes     Partners: Male     Birth control/protection: Tubal ligation       Family History   Problem Relation Age of Onset    Clotting disorder Mother         acute DVt     Cancer Mother     Heart disease Mother         cardiac arrest    Other Mother         respiratory failure     No Known Problems Father     Heart  "disease Maternal Grandfather     Heart attack Maternal Grandfather     Anemia Maternal Grandfather     Clotting disorder Maternal Grandfather     Heart failure Maternal Grandfather        Review of Systems   Constitutional: Negative.  Negative for activity change, appetite change, chills, fatigue and fever.   HENT: Negative.  Negative for congestion, sinus pressure and sinus pain.    Eyes: Negative.  Negative for visual disturbance.   Respiratory:  Positive for shortness of breath. Negative for apnea, cough, chest tightness and wheezing.    Cardiovascular:  Positive for chest pain and palpitations. Negative for leg swelling.   Gastrointestinal: Negative.  Negative for blood in stool.   Endocrine: Negative.  Negative for cold intolerance and heat intolerance.   Genitourinary: Negative.  Negative for hematuria.   Musculoskeletal: Negative.  Negative for gait problem.   Skin: Negative.  Negative for color change, rash and wound.   Allergic/Immunologic: Negative.  Negative for environmental allergies and food allergies.   Neurological:  Positive for dizziness. Negative for syncope, weakness, light-headedness, numbness and headaches.   Hematological: Negative.  Does not bruise/bleed easily.   Psychiatric/Behavioral: Negative.  Negative for sleep disturbance.        Objective   Vitals:    12/23/24 0933   BP: 147/98   BP Location: Right arm   Patient Position: Sitting   Cuff Size: Adult   Pulse: 85   SpO2: 97%   Weight: 105 kg (232 lb)   Height: 165.1 cm (65\")      /98 (BP Location: Right arm, Patient Position: Sitting, Cuff Size: Adult)   Pulse 85   Ht 165.1 cm (65\")   Wt 105 kg (232 lb)   SpO2 97%   BMI 38.61 kg/m²     Lab Results (most recent)       None            Physical Exam  Vitals and nursing note reviewed.   Constitutional:       General: She is not in acute distress.     Appearance: Normal appearance. She is well-developed.   HENT:      Head: Normocephalic and atraumatic.   Eyes:      General: No " scleral icterus.        Right eye: No discharge.         Left eye: No discharge.      Conjunctiva/sclera: Conjunctivae normal.   Neck:      Vascular: No carotid bruit.   Cardiovascular:      Rate and Rhythm: Normal rate and regular rhythm.      Heart sounds: Normal heart sounds. No murmur heard.     No friction rub. No gallop.   Pulmonary:      Effort: Pulmonary effort is normal. No respiratory distress.      Breath sounds: Normal breath sounds. No wheezing or rales.   Chest:      Chest wall: No tenderness.   Musculoskeletal:      Right lower leg: No edema.      Left lower leg: No edema.   Skin:     General: Skin is warm and dry.      Coloration: Skin is not pale.      Findings: No erythema or rash.   Neurological:      Mental Status: She is alert and oriented to person, place, and time.      Cranial Nerves: No cranial nerve deficit.   Psychiatric:         Behavior: Behavior normal.         Procedure   Procedures       Assessment & Plan     Problems Addressed this Visit          Cardiac and Vasculature    Chest pain    Relevant Orders    Treadmill Stress Test    Palpitations    Relevant Orders    Treadmill Stress Test       Pulmonary and Pneumonias    Shortness of breath - Primary    Relevant Orders    Treadmill Stress Test     Diagnoses         Codes Comments    Shortness of breath    -  Primary ICD-10-CM: R06.02  ICD-9-CM: 786.05     Palpitations     ICD-10-CM: R00.2  ICD-9-CM: 785.1     Chest pain, unspecified type     ICD-10-CM: R07.9  ICD-9-CM: 786.50             Recommendations  1.  Patient is a 29-year-old female presenting for evaluation.  She has  acute on chronic chest discomfort with previous cardiac workups being benign.  I am scheduling regular treadmill stress test to evaluate.    2.  I will put her on long-acting nitrates in the setting that she may have vasospastic angina as a cause of her symptoms.  Will see how she responds.    3.  Any chest pain, not resolved by nitroglycerin, I recommend ER  evaluation.    4.  We will see her back for follow-up on the above and recommend further.  Follow-up with primary as scheduled.     Patient did not bring med list or medicine bottles to appointment, med list has been reviewed and updated based on patient's knowledge of their meds.      Electronically signed by:

## 2025-01-27 RX ORDER — RANOLAZINE 500 MG/1
500 TABLET, EXTENDED RELEASE ORAL 2 TIMES DAILY
Qty: 180 TABLET | Refills: 3 | Status: SHIPPED | OUTPATIENT
Start: 2025-01-27

## 2025-03-22 DIAGNOSIS — R07.89 OTHER CHEST PAIN: ICD-10-CM

## 2025-03-24 RX ORDER — NITROGLYCERIN 0.4 MG/1
0.4 TABLET SUBLINGUAL AS NEEDED
Qty: 25 TABLET | Refills: 2 | Status: SHIPPED | OUTPATIENT
Start: 2025-03-24

## 2025-03-24 RX ORDER — ATENOLOL 25 MG/1
25 TABLET ORAL DAILY
Qty: 90 TABLET | Refills: 3 | Status: SHIPPED | OUTPATIENT
Start: 2025-03-24

## 2025-07-23 ENCOUNTER — OFFICE VISIT (OUTPATIENT)
Dept: CARDIOLOGY | Facility: CLINIC | Age: 30
End: 2025-07-23
Payer: COMMERCIAL

## 2025-07-23 VITALS
BODY MASS INDEX: 39.15 KG/M2 | HEART RATE: 94 BPM | WEIGHT: 235 LBS | OXYGEN SATURATION: 98 % | DIASTOLIC BLOOD PRESSURE: 84 MMHG | SYSTOLIC BLOOD PRESSURE: 140 MMHG | HEIGHT: 65 IN

## 2025-07-23 DIAGNOSIS — I10 PRIMARY HYPERTENSION: ICD-10-CM

## 2025-07-23 DIAGNOSIS — R00.2 PALPITATIONS: ICD-10-CM

## 2025-07-23 DIAGNOSIS — R07.9 CHEST PAIN, UNSPECIFIED TYPE: ICD-10-CM

## 2025-07-23 DIAGNOSIS — R06.02 SHORTNESS OF BREATH: Primary | ICD-10-CM

## 2025-07-23 PROCEDURE — 3077F SYST BP >= 140 MM HG: CPT | Performed by: PHYSICIAN ASSISTANT

## 2025-07-23 PROCEDURE — 99214 OFFICE O/P EST MOD 30 MIN: CPT | Performed by: PHYSICIAN ASSISTANT

## 2025-07-23 PROCEDURE — 3079F DIAST BP 80-89 MM HG: CPT | Performed by: PHYSICIAN ASSISTANT

## 2025-07-23 NOTE — LETTER
July 23, 2025     AVINASH Golden  [id number]  [facility] [address]    Patient: Melissa Kramer   YOB: 1995   Date of Visit: 7/23/2025       Dear AVINASH Golden    Melissa Kramer was in my office today. Below is a copy of my note.    If you have questions, please do not hesitate to call me. I look forward to following Melissa along with you.         Sincerely,        LESVIA Penn        CC: No Recipients    Problem list     Subjective  Melissa Kramer is a 29 y.o. female     Chief Complaint   Patient presents with   • testing follow up     Shortness of breath  Patient states did not have stress test completed   Active Problems:  1. Palpitations   1.1 event monitor 4/17-5/16/18-normal sinus rhythm, sinus tach  1.2 Event Monitor 9/7-9/20/2021-sinus rhythm, sinus tach  2. Shortness of breath   2.1 echo from [facility] 3/2/18 - LVEF 60%, normal diastolic dysfunction, trace MR, borderline pulmonary HTN systolic pressure estimated 51 mmHg.   2.2 Echo 12/3/18 - EF 56-60%; trace to mild MR, PA 37  2.3 echo 10/12/2021-EF 55 to 60%, trivial TR  2.4 Echo 7/6/2022-EF 55 to 60%, trivial MR and physiological TR  3. Chest pain   3.1 Stress Test 12/3/18 - no ischemia; low risk   3.2 Cardiac CTA 11/20/19 - no sig stenosis  3.3 stress test 9/9/2021-poor quality images however suggest small, mild anteroapical and inferior apical ischemic defect.  The true apex demonstrates a fixed defect post stress and at rest.  Post-rest EF 60% with inferior basilar hypokinesis.  3.4 LHC 10/21/2021 -normal coronary arteries, EF 60 to 65%, LVEDP 14-16  4. Dizziness   5.  Asthma    HPI    Patient is a 29-year-old female presenting to the office for evaluation.  She is here for preoperative assessment in regards to potential bariatric surgery.    She has done well.  She has chronic complaints of chest pain and dyspnea.  She will feel a random sharp pain in her chest will have a degree of dyspnea that is mild at baseline.  She  describes having history of asthma and feels it could be potentially related to the same.  Overall, her symptoms are stable.  She does not describe any progression of her symptoms at all.  No complaints of PND or orthopnea.    She occasionally may palpitate or sense of fluttering type sensation.  She does not describe any dizziness, presyncope or syncope.  She is stable otherwise.      Current Outpatient Medications on File Prior to Visit   Medication Sig Dispense Refill   • albuterol (PROVENTIL) (2.5 MG/3ML) 0.083% nebulizer solution      • ALBUTEROL IN 90 mcg. PRN     • atenolol (TENORMIN) 25 MG tablet TAKE 1 TABLET BY MOUTH DAILY 90 tablet 3   • fluticasone (FLONASE) 50 MCG/ACT nasal spray Administer 1 spray into the nostril(s) as directed by provider As Needed.     • fluticasone-salmeterol (ADVAIR) 250-50 MCG/DOSE DISKUS Inhale 2 puffs 2 (Two) Times a Day.     • isosorbide mononitrate (IMDUR) 30 MG 24 hr tablet Take 1 tablet by mouth Every Morning. 30 tablet 11   • nitroglycerin (NITROSTAT) 0.4 MG SL tablet DISSOLVE 1 TAB UNDER TONGUE FOR CHEST PAIN - IF PAIN REMAINS AFTER 5 MIN, CALL 911 AND REPEAT DOSE. MAX 3 TABS IN 15 MINUTES 25 tablet 2   • ranolazine (RANEXA) 500 MG 12 hr tablet TAKE 1 TABLET BY MOUTH 2 TIMES A  tablet 3   • rizatriptan MLT (MAXALT-MLT) 10 MG disintegrating tablet Place 1 tablet on the tongue As Needed.     • sertraline (ZOLOFT) 50 MG tablet Take 1 tablet by mouth Daily.     • [DISCONTINUED] Ajovy 225 MG/1.5ML solution auto-injector Every 30 (Thirty) Days.       No current facility-administered medications on file prior to visit.       Keflex [cephalexin]    Past Medical History:   Diagnosis Date   • Asthma    • COVID-19 12/02/2020    May 6, 2022 ( 2nd time)   • COVID-19 vaccine administered 1st- 11/17/2017 12/08/2021 - zpfizer    • Migraines    • Mitral valve regurgitation    • Mixed hyperlipidemia 11/09/2021   • Palpitations    • Post concussion syndrome    • Primary  hypertension 7/23/2025   • Pulmonary HTN    • Pulmonary hypertension 09/07/2021   • TMJ (dislocation of temporomandibular joint)        Social History     Socioeconomic History   • Marital status:    Tobacco Use   • Smoking status: Never   • Smokeless tobacco: Never   Vaping Use   • Vaping status: Every Day   Substance and Sexual Activity   • Alcohol use: Not Currently     Comment: occasionally   • Drug use: No   • Sexual activity: Yes     Partners: Male     Birth control/protection: Tubal ligation       Family History   Problem Relation Age of Onset   • Clotting disorder Mother         acute DVt    • Cancer Mother    • Heart disease Mother         cardiac arrest   • Other Mother         respiratory failure    • No Known Problems Father    • Heart disease Maternal Grandfather    • Heart attack Maternal Grandfather    • Anemia Maternal Grandfather    • Clotting disorder Maternal Grandfather    • Heart failure Maternal Grandfather        Review of Systems   Constitutional:  Negative for activity change, appetite change, chills, fatigue and fever.   HENT: Negative.  Negative for congestion, sinus pressure and sinus pain.    Eyes: Negative.  Negative for visual disturbance.   Respiratory:  Positive for shortness of breath. Negative for apnea, cough, chest tightness and wheezing.    Cardiovascular:  Positive for chest pain (ongoing) and palpitations. Negative for leg swelling.   Gastrointestinal:  Negative for blood in stool.   Endocrine: Negative.  Negative for cold intolerance and heat intolerance.   Genitourinary: Negative.  Negative for hematuria.   Musculoskeletal: Negative.  Negative for gait problem.   Skin: Negative.  Negative for color change, rash and wound.   Allergic/Immunologic: Negative.  Negative for environmental allergies and food allergies.   Neurological:  Negative for dizziness, syncope, weakness, light-headedness, numbness and headaches.   Hematological: Negative.  Does not bruise/bleed  "easily.   Psychiatric/Behavioral: Negative.  Negative for sleep disturbance.        Objective  Vitals:    07/23/25 0923   BP: 140/84   BP Location: Left arm   Patient Position: Sitting   Cuff Size: Adult   Pulse: 94   SpO2: 98%   Weight: 107 kg (235 lb)   Height: 165.1 cm (65\")      /84 (BP Location: Left arm, Patient Position: Sitting, Cuff Size: Adult)   Pulse 94   Ht 165.1 cm (65\")   Wt 107 kg (235 lb)   SpO2 98%   BMI 39.11 kg/m²     Lab Results (most recent)       None            Physical Exam  Vitals and nursing note reviewed.   Constitutional:       General: She is not in acute distress.     Appearance: Normal appearance. She is well-developed.   HENT:      Head: Normocephalic and atraumatic.   Eyes:      General: No scleral icterus.        Right eye: No discharge.         Left eye: No discharge.      Conjunctiva/sclera: Conjunctivae normal.   Neck:      Vascular: No carotid bruit.   Cardiovascular:      Rate and Rhythm: Normal rate and regular rhythm.      Heart sounds: Normal heart sounds. No murmur heard.     No friction rub. No gallop.   Pulmonary:      Effort: Pulmonary effort is normal. No respiratory distress.      Breath sounds: Normal breath sounds. No wheezing or rales.   Chest:      Chest wall: No tenderness.   Musculoskeletal:      Right lower leg: No edema.      Left lower leg: No edema.   Skin:     General: Skin is warm and dry.      Coloration: Skin is not pale.      Findings: No erythema or rash.   Neurological:      Mental Status: She is alert and oriented to person, place, and time.      Cranial Nerves: No cranial nerve deficit.   Psychiatric:         Behavior: Behavior normal.         Procedure  Procedures       Assessment & Plan    Problems Addressed this Visit          Cardiac and Vasculature    Chest pain    Palpitations    Primary hypertension       Pulmonary and Pneumonias    Shortness of breath - Primary     Diagnoses         Codes Comments      Shortness of breath    -  " Primary ICD-10-CM: R06.02  ICD-9-CM: 786.05       Chest pain, unspecified type     ICD-10-CM: R07.9  ICD-9-CM: 786.50       Palpitations     ICD-10-CM: R00.2  ICD-9-CM: 785.1       Primary hypertension     ICD-10-CM: I10  ICD-9-CM: 401.9             Recommendations  1.  Patient is a 29-year-old female presenting to the office for evaluation.  She has complaints of chest pain, dyspnea and palpitations but all these appear to be chronic.  She does not describe any progression of any of them.  Previous cardiac workup including cardiac catheterization was normal.  For now, nothing further in the symptoms were to change.  She feels she is doing well at this time.    2.  Patient's blood pressure is slightly elevated today.  I will have her monitor that at home.  We can make adjustments telephonically if needed.    3.  For now, she is stable.  We can see her back for follow-up in a year or sooner if symptoms worsen.  Follow-up with primary as scheduled.           Melissa Kramer  reports that she has never smoked. She has never used smokeless tobacco.     Patient did not bring med list or medicine bottles to appointment, med list has been reviewed and updated based on patient's knowledge of their meds.      Electronically signed by:

## 2025-07-23 NOTE — PROGRESS NOTES
Problem list     Subjective   Melissa Kramer is a 29 y.o. female     Chief Complaint   Patient presents with    testing follow up     Shortness of breath  Patient states did not have stress test completed   Active Problems:  1. Palpitations   1.1 event monitor 4/17-5/16/18-normal sinus rhythm, sinus tach  1.2 Event Monitor 9/7-9/20/2021-sinus rhythm, sinus tach  2. Shortness of breath   2.1 echo from [facility] 3/2/18 - LVEF 60%, normal diastolic dysfunction, trace MR, borderline pulmonary HTN systolic pressure estimated 51 mmHg.   2.2 Echo 12/3/18 - EF 56-60%; trace to mild MR, PA 37  2.3 echo 10/12/2021-EF 55 to 60%, trivial TR  2.4 Echo 7/6/2022-EF 55 to 60%, trivial MR and physiological TR  3. Chest pain   3.1 Stress Test 12/3/18 - no ischemia; low risk   3.2 Cardiac CTA 11/20/19 - no sig stenosis  3.3 stress test 9/9/2021-poor quality images however suggest small, mild anteroapical and inferior apical ischemic defect.  The true apex demonstrates a fixed defect post stress and at rest.  Post-rest EF 60% with inferior basilar hypokinesis.  3.4 C 10/21/2021 -normal coronary arteries, EF 60 to 65%, LVEDP 14-16  4. Dizziness   5.  Asthma    HPI    Patient is a 29-year-old female presenting to the office for evaluation.  She is here for preoperative assessment in regards to potential bariatric surgery.    She has done well.  She has chronic complaints of chest pain and dyspnea.  She will feel a random sharp pain in her chest will have a degree of dyspnea that is mild at baseline.  She describes having history of asthma and feels it could be potentially related to the same.  Overall, her symptoms are stable.  She does not describe any progression of her symptoms at all.  No complaints of PND or orthopnea.    She occasionally may palpitate or sense of fluttering type sensation.  She does not describe any dizziness, presyncope or syncope.  She is stable otherwise.      Current Outpatient Medications on File Prior to Visit    Medication Sig Dispense Refill    albuterol (PROVENTIL) (2.5 MG/3ML) 0.083% nebulizer solution       ALBUTEROL IN 90 mcg. PRN      atenolol (TENORMIN) 25 MG tablet TAKE 1 TABLET BY MOUTH DAILY 90 tablet 3    fluticasone (FLONASE) 50 MCG/ACT nasal spray Administer 1 spray into the nostril(s) as directed by provider As Needed.      fluticasone-salmeterol (ADVAIR) 250-50 MCG/DOSE DISKUS Inhale 2 puffs 2 (Two) Times a Day.      isosorbide mononitrate (IMDUR) 30 MG 24 hr tablet Take 1 tablet by mouth Every Morning. 30 tablet 11    nitroglycerin (NITROSTAT) 0.4 MG SL tablet DISSOLVE 1 TAB UNDER TONGUE FOR CHEST PAIN - IF PAIN REMAINS AFTER 5 MIN, CALL 911 AND REPEAT DOSE. MAX 3 TABS IN 15 MINUTES 25 tablet 2    ranolazine (RANEXA) 500 MG 12 hr tablet TAKE 1 TABLET BY MOUTH 2 TIMES A  tablet 3    rizatriptan MLT (MAXALT-MLT) 10 MG disintegrating tablet Place 1 tablet on the tongue As Needed.      sertraline (ZOLOFT) 50 MG tablet Take 1 tablet by mouth Daily.      [DISCONTINUED] Ajovy 225 MG/1.5ML solution auto-injector Every 30 (Thirty) Days.       No current facility-administered medications on file prior to visit.       Keflex [cephalexin]    Past Medical History:   Diagnosis Date    Asthma     COVID-19 12/02/2020    May 6, 2022 ( 2nd time)    COVID-19 vaccine administered 1st- 11/17/2017 12/08/2021 - [facility]     Migraines     Mitral valve regurgitation     Mixed hyperlipidemia 11/09/2021    Palpitations     Post concussion syndrome     Primary hypertension 7/23/2025    Pulmonary HTN     Pulmonary hypertension 09/07/2021    TMJ (dislocation of temporomandibular joint)        Social History     Socioeconomic History    Marital status:    Tobacco Use    Smoking status: Never    Smokeless tobacco: Never   Vaping Use    Vaping status: Every Day   Substance and Sexual Activity    Alcohol use: Not Currently     Comment: occasionally    Drug use: No    Sexual activity: Yes     Partners: Male     Birth  "control/protection: Tubal ligation       Family History   Problem Relation Age of Onset    Clotting disorder Mother         acute DVt     Cancer Mother     Heart disease Mother         cardiac arrest    Other Mother         respiratory failure     No Known Problems Father     Heart disease Maternal Grandfather     Heart attack Maternal Grandfather     Anemia Maternal Grandfather     Clotting disorder Maternal Grandfather     Heart failure Maternal Grandfather        Review of Systems   Constitutional:  Negative for activity change, appetite change, chills, fatigue and fever.   HENT: Negative.  Negative for congestion, sinus pressure and sinus pain.    Eyes: Negative.  Negative for visual disturbance.   Respiratory:  Positive for shortness of breath. Negative for apnea, cough, chest tightness and wheezing.    Cardiovascular:  Positive for chest pain (ongoing) and palpitations. Negative for leg swelling.   Gastrointestinal:  Negative for blood in stool.   Endocrine: Negative.  Negative for cold intolerance and heat intolerance.   Genitourinary: Negative.  Negative for hematuria.   Musculoskeletal: Negative.  Negative for gait problem.   Skin: Negative.  Negative for color change, rash and wound.   Allergic/Immunologic: Negative.  Negative for environmental allergies and food allergies.   Neurological:  Negative for dizziness, syncope, weakness, light-headedness, numbness and headaches.   Hematological: Negative.  Does not bruise/bleed easily.   Psychiatric/Behavioral: Negative.  Negative for sleep disturbance.        Objective   Vitals:    07/23/25 0923   BP: 140/84   BP Location: Left arm   Patient Position: Sitting   Cuff Size: Adult   Pulse: 94   SpO2: 98%   Weight: 107 kg (235 lb)   Height: 165.1 cm (65\")      /84 (BP Location: Left arm, Patient Position: Sitting, Cuff Size: Adult)   Pulse 94   Ht 165.1 cm (65\")   Wt 107 kg (235 lb)   SpO2 98%   BMI 39.11 kg/m²     Lab Results (most recent)       None "            Physical Exam  Vitals and nursing note reviewed.   Constitutional:       General: She is not in acute distress.     Appearance: Normal appearance. She is well-developed.   HENT:      Head: Normocephalic and atraumatic.   Eyes:      General: No scleral icterus.        Right eye: No discharge.         Left eye: No discharge.      Conjunctiva/sclera: Conjunctivae normal.   Neck:      Vascular: No carotid bruit.   Cardiovascular:      Rate and Rhythm: Normal rate and regular rhythm.      Heart sounds: Normal heart sounds. No murmur heard.     No friction rub. No gallop.   Pulmonary:      Effort: Pulmonary effort is normal. No respiratory distress.      Breath sounds: Normal breath sounds. No wheezing or rales.   Chest:      Chest wall: No tenderness.   Musculoskeletal:      Right lower leg: No edema.      Left lower leg: No edema.   Skin:     General: Skin is warm and dry.      Coloration: Skin is not pale.      Findings: No erythema or rash.   Neurological:      Mental Status: She is alert and oriented to person, place, and time.      Cranial Nerves: No cranial nerve deficit.   Psychiatric:         Behavior: Behavior normal.         Procedure   Procedures       Assessment & Plan     Problems Addressed this Visit          Cardiac and Vasculature    Chest pain    Palpitations    Primary hypertension       Pulmonary and Pneumonias    Shortness of breath - Primary     Diagnoses         Codes Comments      Shortness of breath    -  Primary ICD-10-CM: R06.02  ICD-9-CM: 786.05       Chest pain, unspecified type     ICD-10-CM: R07.9  ICD-9-CM: 786.50       Palpitations     ICD-10-CM: R00.2  ICD-9-CM: 785.1       Primary hypertension     ICD-10-CM: I10  ICD-9-CM: 401.9             Recommendations  1.  Patient is a 29-year-old female presenting to the office for evaluation.  She has complaints of chest pain, dyspnea and palpitations but all these appear to be chronic.  She does not describe any progression of any of  them.  Previous cardiac workup including cardiac catheterization was normal.  For now, nothing further in the symptoms were to change.  She feels she is doing well at this time.    2.  Patient's blood pressure is slightly elevated today.  I will have her monitor that at home.  We can make adjustments telephonically if needed.    3.  For now, she is stable.  We can see her back for follow-up in a year or sooner if symptoms worsen.  Follow-up with primary as scheduled.           Melissa Kramer  reports that she has never smoked. She has never used smokeless tobacco.     Patient did not bring med list or medicine bottles to appointment, med list has been reviewed and updated based on patient's knowledge of their meds.      Electronically signed by:

## 2025-07-24 ENCOUNTER — TELEPHONE (OUTPATIENT)
Dept: CARDIOLOGY | Facility: CLINIC | Age: 30
End: 2025-07-24
Payer: COMMERCIAL

## 2025-07-24 NOTE — TELEPHONE ENCOUNTER
Received cardiac clearance request from DR IRINEO ANTOINE stating pt has BARIATRIC SURGERY scheduled for 08/2025 and is requiring a cardiac clearance. Placed cardiac clearance request in DONNY'S inbox to review and address with provider.